# Patient Record
Sex: MALE | Race: ASIAN | NOT HISPANIC OR LATINO | URBAN - METROPOLITAN AREA
[De-identification: names, ages, dates, MRNs, and addresses within clinical notes are randomized per-mention and may not be internally consistent; named-entity substitution may affect disease eponyms.]

---

## 2017-11-13 ENCOUNTER — INPATIENT (INPATIENT)
Facility: HOSPITAL | Age: 72
LOS: 0 days | Discharge: HOME | DRG: 287 | End: 2017-11-13
Attending: INTERNAL MEDICINE | Admitting: INTERNAL MEDICINE
Payer: COMMERCIAL

## 2017-11-13 ENCOUNTER — TRANSCRIPTION ENCOUNTER (OUTPATIENT)
Age: 72
End: 2017-11-13

## 2017-11-13 VITALS
RESPIRATION RATE: 16 BRPM | DIASTOLIC BLOOD PRESSURE: 66 MMHG | OXYGEN SATURATION: 100 % | HEART RATE: 61 BPM | SYSTOLIC BLOOD PRESSURE: 120 MMHG | TEMPERATURE: 98 F

## 2017-11-13 VITALS
WEIGHT: 173.94 LBS | RESPIRATION RATE: 16 BRPM | DIASTOLIC BLOOD PRESSURE: 77 MMHG | HEART RATE: 58 BPM | SYSTOLIC BLOOD PRESSURE: 135 MMHG | OXYGEN SATURATION: 96 % | HEIGHT: 69 IN

## 2017-11-13 DIAGNOSIS — Z98.890 OTHER SPECIFIED POSTPROCEDURAL STATES: Chronic | ICD-10-CM

## 2017-11-13 DIAGNOSIS — I20.0 UNSTABLE ANGINA: ICD-10-CM

## 2017-11-13 PROBLEM — Z00.00 ENCOUNTER FOR PREVENTIVE HEALTH EXAMINATION: Status: ACTIVE | Noted: 2017-11-13

## 2017-11-13 LAB
ALBUMIN SERPL ELPH-MCNC: 4.4 G/DL — SIGNIFICANT CHANGE UP (ref 3.3–5)
ALP SERPL-CCNC: 35 U/L — LOW (ref 40–120)
ALT FLD-CCNC: 19 U/L RC — SIGNIFICANT CHANGE UP (ref 10–45)
ANION GAP SERPL CALC-SCNC: 12 MMOL/L — SIGNIFICANT CHANGE UP (ref 5–17)
AST SERPL-CCNC: 24 U/L — SIGNIFICANT CHANGE UP (ref 10–40)
BASOPHILS # BLD AUTO: 0.1 K/UL — SIGNIFICANT CHANGE UP (ref 0–0.2)
BASOPHILS NFR BLD AUTO: 1.2 % — SIGNIFICANT CHANGE UP (ref 0–2)
BILIRUB SERPL-MCNC: 0.2 MG/DL — SIGNIFICANT CHANGE UP (ref 0.2–1.2)
BUN SERPL-MCNC: 16 MG/DL — SIGNIFICANT CHANGE UP (ref 7–23)
CALCIUM SERPL-MCNC: 9.7 MG/DL — SIGNIFICANT CHANGE UP (ref 8.4–10.5)
CHLORIDE SERPL-SCNC: 101 MMOL/L — SIGNIFICANT CHANGE UP (ref 96–108)
CO2 SERPL-SCNC: 25 MMOL/L — SIGNIFICANT CHANGE UP (ref 22–31)
CREAT SERPL-MCNC: 1.37 MG/DL — HIGH (ref 0.5–1.3)
EOSINOPHIL # BLD AUTO: 0.3 K/UL — SIGNIFICANT CHANGE UP (ref 0–0.5)
EOSINOPHIL NFR BLD AUTO: 4.8 % — SIGNIFICANT CHANGE UP (ref 0–6)
GAS PNL BLDV: SIGNIFICANT CHANGE UP
GLUCOSE BLDC GLUCOMTR-MCNC: 62 MG/DL — LOW (ref 70–99)
GLUCOSE BLDC GLUCOMTR-MCNC: 72 MG/DL — SIGNIFICANT CHANGE UP (ref 70–99)
GLUCOSE SERPL-MCNC: 211 MG/DL — HIGH (ref 70–99)
HCT VFR BLD CALC: 38.9 % — LOW (ref 39–50)
HGB BLD-MCNC: 12.7 G/DL — LOW (ref 13–17)
INR BLD: 1.11 RATIO — SIGNIFICANT CHANGE UP (ref 0.88–1.16)
LYMPHOCYTES # BLD AUTO: 2.5 K/UL — SIGNIFICANT CHANGE UP (ref 1–3.3)
LYMPHOCYTES # BLD AUTO: 39.6 % — SIGNIFICANT CHANGE UP (ref 13–44)
MCHC RBC-ENTMCNC: 27 PG — SIGNIFICANT CHANGE UP (ref 27–34)
MCHC RBC-ENTMCNC: 32.8 GM/DL — SIGNIFICANT CHANGE UP (ref 32–36)
MCV RBC AUTO: 82.5 FL — SIGNIFICANT CHANGE UP (ref 80–100)
MONOCYTES # BLD AUTO: 0.5 K/UL — SIGNIFICANT CHANGE UP (ref 0–0.9)
MONOCYTES NFR BLD AUTO: 8.8 % — SIGNIFICANT CHANGE UP (ref 2–14)
NEUTROPHILS # BLD AUTO: 2.9 K/UL — SIGNIFICANT CHANGE UP (ref 1.8–7.4)
NEUTROPHILS NFR BLD AUTO: 45.7 % — SIGNIFICANT CHANGE UP (ref 43–77)
PLATELET # BLD AUTO: 216 K/UL — SIGNIFICANT CHANGE UP (ref 150–400)
POTASSIUM SERPL-MCNC: 4 MMOL/L — SIGNIFICANT CHANGE UP (ref 3.5–5.3)
POTASSIUM SERPL-SCNC: 4 MMOL/L — SIGNIFICANT CHANGE UP (ref 3.5–5.3)
PROT SERPL-MCNC: 7.1 G/DL — SIGNIFICANT CHANGE UP (ref 6–8.3)
PROTHROM AB SERPL-ACNC: 12 SEC — SIGNIFICANT CHANGE UP (ref 9.8–12.7)
RBC # BLD: 4.71 M/UL — SIGNIFICANT CHANGE UP (ref 4.2–5.8)
RBC # FLD: 11.8 % — SIGNIFICANT CHANGE UP (ref 10.3–14.5)
SODIUM SERPL-SCNC: 138 MMOL/L — SIGNIFICANT CHANGE UP (ref 135–145)
TROPONIN T SERPL-MCNC: <0.01 NG/ML — SIGNIFICANT CHANGE UP (ref 0–0.06)
WBC # BLD: 6.3 K/UL — SIGNIFICANT CHANGE UP (ref 3.8–10.5)
WBC # FLD AUTO: 6.3 K/UL — SIGNIFICANT CHANGE UP (ref 3.8–10.5)

## 2017-11-13 PROCEDURE — 93458 L HRT ARTERY/VENTRICLE ANGIO: CPT | Mod: 26

## 2017-11-13 PROCEDURE — 99285 EMERGENCY DEPT VISIT HI MDM: CPT

## 2017-11-13 PROCEDURE — 71010: CPT | Mod: 26

## 2017-11-13 PROCEDURE — 99223 1ST HOSP IP/OBS HIGH 75: CPT

## 2017-11-13 PROCEDURE — 99152 MOD SED SAME PHYS/QHP 5/>YRS: CPT

## 2017-11-13 RX ORDER — ATORVASTATIN CALCIUM 80 MG/1
40 TABLET, FILM COATED ORAL AT BEDTIME
Qty: 0 | Refills: 0 | Status: DISCONTINUED | OUTPATIENT
Start: 2017-11-13 | End: 2017-11-13

## 2017-11-13 RX ORDER — HEPARIN SODIUM 5000 [USP'U]/ML
4700 INJECTION INTRAVENOUS; SUBCUTANEOUS EVERY 6 HOURS
Qty: 0 | Refills: 0 | Status: DISCONTINUED | OUTPATIENT
Start: 2017-11-13 | End: 2017-11-13

## 2017-11-13 RX ORDER — NITROGLYCERIN 6.5 MG
1 CAPSULE, EXTENDED RELEASE ORAL
Qty: 0 | Refills: 0 | COMMUNITY

## 2017-11-13 RX ORDER — ROSUVASTATIN CALCIUM 5 MG/1
1 TABLET ORAL
Qty: 90 | Refills: 0
Start: 2017-11-13 | End: 2018-02-11

## 2017-11-13 RX ORDER — ASPIRIN/CALCIUM CARB/MAGNESIUM 324 MG
325 TABLET ORAL ONCE
Qty: 0 | Refills: 0 | Status: COMPLETED | OUTPATIENT
Start: 2017-11-13 | End: 2017-11-13

## 2017-11-13 RX ORDER — CLOPIDOGREL BISULFATE 75 MG/1
75 TABLET, FILM COATED ORAL DAILY
Qty: 0 | Refills: 0 | Status: DISCONTINUED | OUTPATIENT
Start: 2017-11-13 | End: 2017-11-13

## 2017-11-13 RX ORDER — HEPARIN SODIUM 5000 [USP'U]/ML
4700 INJECTION INTRAVENOUS; SUBCUTANEOUS ONCE
Qty: 0 | Refills: 0 | Status: COMPLETED | OUTPATIENT
Start: 2017-11-13 | End: 2017-11-13

## 2017-11-13 RX ORDER — ASPIRIN/CALCIUM CARB/MAGNESIUM 324 MG
81 TABLET ORAL DAILY
Qty: 0 | Refills: 0 | Status: DISCONTINUED | OUTPATIENT
Start: 2017-11-13 | End: 2017-11-13

## 2017-11-13 RX ORDER — CLOPIDOGREL BISULFATE 75 MG/1
1 TABLET, FILM COATED ORAL
Qty: 90 | Refills: 0 | OUTPATIENT
Start: 2017-11-13 | End: 2018-02-11

## 2017-11-13 RX ORDER — METFORMIN HYDROCHLORIDE 850 MG/1
0 TABLET ORAL
Qty: 0 | Refills: 0 | COMMUNITY

## 2017-11-13 RX ORDER — ROSUVASTATIN CALCIUM 5 MG/1
1 TABLET ORAL
Qty: 0 | Refills: 0 | COMMUNITY

## 2017-11-13 RX ORDER — HEPARIN SODIUM 5000 [USP'U]/ML
INJECTION INTRAVENOUS; SUBCUTANEOUS
Qty: 25000 | Refills: 0 | Status: DISCONTINUED | OUTPATIENT
Start: 2017-11-13 | End: 2017-11-13

## 2017-11-13 RX ORDER — NITROGLYCERIN 6.5 MG
1 CAPSULE, EXTENDED RELEASE ORAL
Qty: 30 | Refills: 0 | OUTPATIENT
Start: 2017-11-13 | End: 2017-12-13

## 2017-11-13 RX ORDER — METFORMIN HYDROCHLORIDE 850 MG/1
1 TABLET ORAL
Qty: 0 | Refills: 0 | COMMUNITY

## 2017-11-13 RX ADMIN — HEPARIN SODIUM 950 UNIT(S)/HR: 5000 INJECTION INTRAVENOUS; SUBCUTANEOUS at 13:41

## 2017-11-13 RX ADMIN — Medication 325 MILLIGRAM(S): at 11:21

## 2017-11-13 RX ADMIN — HEPARIN SODIUM 4700 UNIT(S): 5000 INJECTION INTRAVENOUS; SUBCUTANEOUS at 13:36

## 2017-11-13 NOTE — CONSULT NOTE ADULT - ATTENDING COMMENTS
Patient interviewed, examined and chart reviewed.  Case discussed with fellow.  Agree w/ Assessment and Plan as outlined.    Rj Miller MD [facility]  P: [phone]  Spectra:  [phone]  Office: [phone]

## 2017-11-13 NOTE — H&P CARDIOLOGY - PMH
Diabetes    Dyslipidemia    Hypertension Constipation    Diabetes    Dyslipidemia    Hypertension    Hyperthyroidism CAD (coronary artery disease)    Constipation    Diabetes    Dyslipidemia    Hypertension    Hyperthyroidism

## 2017-11-13 NOTE — ED PROVIDER NOTE - MEDICAL DECISION MAKING DETAILS
pT WITH cad HAD ANGIO 2013 LAD stenosis with good collaterals has worsening with exertional chest pain also rest pains no fever cough Heart regular no murmur no gallop Lungs clear to A and P abdomen soft non focal neuro exam will admit for coronaries eval start on heparin--Connor

## 2017-11-13 NOTE — DISCHARGE NOTE ADULT - MEDICATION SUMMARY - MEDICATIONS TO STOP TAKING
I will STOP taking the medications listed below when I get home from the hospital:    rosuvastatin 10 mg oral tablet  -- 1 tab(s) by mouth once a day (at bedtime)

## 2017-11-13 NOTE — DISCHARGE NOTE ADULT - HOSPITAL COURSE
72yo gentleman w/ PMHx CAD  prior stent mLAD, HTN, HLD,  T 2 DM with  Hg A1c  6.7. He presents  with a 3 day hx of 3/10 exertion chest pain that is  non radiating substernal to L. side with  a few steps associated with L. arm paresthesias, lightheadedness, and feeling warm/flushed. The symptoms improve with rest. No episodes at rest.  No associated nausea/vomiting, abdominal pain, back pain, or significant SOB. Currently chest pain free. The patient is s/p cardiac cath via RRA with mLAD 100% . Plan is to stage intervention next week. Right radial site without bleeding or hematoma, strong pulse and good cap refill of fingers. The patient medication have been adjusted. Will add Plavix 75 mg po qd, increase crestor 20 mg po qd.  Patient advised to return to hospital with chest pain.

## 2017-11-13 NOTE — DISCHARGE NOTE ADULT - MEDICATION SUMMARY - MEDICATIONS TO TAKE
I will START or STAY ON the medications listed below when I get home from the hospital:    Aspirin Enteric Coated 81 mg oral delayed release tablet  -- 1 tab(s) by mouth once a day  -- Indication: For Heart    nitroglycerin 0.4 mg sublingual tablet  -- 1 tab(s) under tongue every 5 minutes  x 3 doses as needed  -- It is very important that you take or use this exactly as directed.  Do not skip doses or discontinue unless directed by your doctor.    -- Indication: For Chest pain    metFORMIN 500 mg oral tablet, extended release  -- 1 tab(s) by mouth once a day, restart on 11/16/17  -- Indication: For Diabetes    Crestor 20 mg oral tablet  -- 1 tab(s) by mouth once a day (at bedtime)   -- Do not take dairy products, antacids, or iron preparations within one hour of this medication.  Do not take this drug if you are pregnant.  It is very important that you take or use this exactly as directed.  Do not skip doses or discontinue unless directed by your doctor.    -- Indication: For Cholesterol    fenofibric acid 135 mg oral delayed release capsule  -- 1 cap(s) by mouth once a day  -- Indication: For Cholestrol    Plavix 75 mg oral tablet  -- 1 tab(s) by mouth once a day   -- Do not take aspirin or aspirin containing products without knowledge and consent of your physician.    -- Indication: For Heart    methIMAzole 5 mg oral tablet  -- orally once a day  -- Indication: For Thyroid    Bystolic 2.5 mg oral tablet  -- 1 tab(s) by mouth once a day  -- Indication: For Blood pressure    Linzess 72 mcg oral capsule  -- 1 cap(s) by mouth once a day, As Needed  -- Indication: For Constipation    glucosamine  -- Indication: For supplement    Centrum oral tablet  -- 1 tab(s) by mouth once a day  -- Indication: For supplement    Vitamin D3 2000 intl units oral capsule  -- 1 cap(s) by mouth once a day  -- Indication: For supplement

## 2017-11-13 NOTE — ED PROVIDER NOTE - PMH
CAD (coronary artery disease)    Constipation    Diabetes    Dyslipidemia    Hypertension    Hyperthyroidism

## 2017-11-13 NOTE — DISCHARGE NOTE ADULT - PATIENT PORTAL LINK FT
“You can access the Follow[employer] Patient Portal, offered by [facility], by registering with the following website: [email or web address]”

## 2017-11-13 NOTE — DISCHARGE NOTE ADULT - CARE PROVIDER_API CALL
Homa Calvert), Psychiatry  [address] [address]  Phone: [phone]  Fax: [phone]

## 2017-11-13 NOTE — ED PROVIDER NOTE - FAMILY HISTORY
Father  Still living? Unknown  Family history of heart attack, Age at diagnosis: Age Unknown     Mother  Still living? Unknown  Family history of heart attack, Age at diagnosis: Age Unknown     Sibling  Still living? Unknown  Family history of heart attack, Age at diagnosis: Age Unknown

## 2017-11-13 NOTE — H&P CARDIOLOGY - HISTORY OF PRESENT ILLNESS
70yo gentleman w/ PMHx CAD  prior stent mLAD, HTN, HLD,  T 2 DM with  Hg A1c . He presents  with a 3 day hx of 3/10 exertion chest pain that is  non radiating substernal to L. side with  a few steps associated with L. arm paresthesias, lightheadedness, and feeling warm/flushed. The symptoms improve with rest. No episodes at rest.  No associated nausea/vomiting, abdominal pain, back pain, or significant SOB. Currently chest pain free. 70yo gentleman w/ PMHx CAD  prior stent mLAD, HTN, HLD,  T 2 DM with  Hg A1c  6.7. He presents  with a 3 day hx of 3/10 exertion chest pain that is  non radiating substernal to L. side with  a few steps associated with L. arm paresthesias, lightheadedness, and feeling warm/flushed. The symptoms improve with rest. No episodes at rest.  No associated nausea/vomiting, abdominal pain, back pain, or significant SOB. Currently chest pain free. 72yo gentleman w/ PMHx CAD  prior stent mLAD, HTN, HLD,  T 2 DM with  Hg A1c  6.7. He presents  with a 3 day hx of 3/10 exertion chest pain that is  non radiating substernal to L. side with  a few steps associated with L. arm paresthesias, lightheadedness, and feeling warm/flushed. The symptoms improve with rest. No episodes at rest.  No associated nausea/vomiting, abdominal pain, back pain, or significant SOB. Currently chest pain free.     This  H& P was completed  post cardiac cath

## 2017-11-13 NOTE — CONSULT NOTE ADULT - SUBJECTIVE AND OBJECTIVE BOX
Date of Admission:    Patient is a 71y old  Male who presents with a chief complaint of     HISTORY OF PRESENT ILLNESS:     70yo gentleman w/PMHx CAD  prior stent mLAD, HTN, HLD, DM presenting with exertional chest pain for 3 days. Reporting nonradiating substernal to L. sided chest pain 3/10 when taking a few steps associated with L. arm paresthesias, lightheadedness, and feeling warm/flushed. The symptoms improve with rest. No episodes at rest. Reports that he is adherent with his medications. No associated nausea/vomiting, abdominal pain, back pain, or significant SOB. Currently chest pain free.     Allergies    No Known Allergies  	  MEDICATIONS:  Fenofibric acid 135mg   rosuvastatin 10mg qd  bystolic 2.5mg qd  aspirin 81mg qd  metformin  methimazole 5mg qd  Linzeee 72mcg  MVI             PAST MEDICAL & SURGICAL HISTORY:  CAD  HTN  HLD     FAMILY HISTORY:  Mother/Father, multiple siblings with CAD/HTN/HLD    SOCIAL HISTORY:    [ ] Non-smoker        REVIEW OF SYSTEMS:    CONSTITUTIONAL: No weakness, fevers or chills  EYES/ENT: No visual changes;  No dysphagia  NECK: No pain or stiffness  RESPIRATORY: No cough, wheezing, hemoptysis; No shortness of breath  CARDIOVASCULAR: No palpitations; No lower extremity edema  GASTROINTESTINAL: No abdominal or epigastric pain. No nausea, vomiting, or hematemesis; No diarrhea or constipation. No melena or hematochezia.  BACK: No back pain  GENITOURINARY: No dysuria, frequency or hematuria  NEUROLOGICAL: No numbness or weakness  SKIN: No itching, burning, rashes, or lesions   All other review of systems is negative unless indicated above.    PHYSICAL EXAM:  T(C): 36.5 (11-13-17 @ 11:38), Max: 36.6 (11-13-17 @ 11:30)  HR: 61 (11-13-17 @ 11:38) (57 - 61)  BP: 120/66 (11-13-17 @ 11:38) (118/69 - 120/66)  RR: 20 (11-13-17 @ 11:38) (16 - 20)  SpO2: 100% (11-13-17 @ 11:38) (97% - 100%)  Wt(kg): --  I&O's Summary      Appearance: Well appearing, no acute distress, wife and daughter at bedside 	  HEENT:   Normal oral mucosa, PERRL, EOMI	  Lymphatic: No lymphadenopathy  Cardiovascular: Normal S1 S2, No JVD, No murmurs, No edema  Respiratory: Lungs clear to auscultation	  Psychiatry: A & O x 3, Mood & affect appropriate  Gastrointestinal:  Soft, Non-tender, + BS	  Skin: No rashes, No ecchymoses, No cyanosis	  Neurologic: Non-focal  Extremities: Normal range of motion, No clubbing, cyanosis or edema  Vascular: Peripheral pulses palpable 2+ bilaterally        LABS:	 	    CBC Full  -  ( 13 Nov 2017 10:17 )  WBC Count : 6.3 K/uL  Hemoglobin : 12.7 g/dL  Hematocrit : 38.9 %  Platelet Count - Automated : 216 K/uL  Mean Cell Volume : 82.5 fl  Mean Cell Hemoglobin : 27.0 pg  Mean Cell Hemoglobin Concentration : 32.8 gm/dL  Auto Neutrophil # : 2.9 K/uL  Auto Lymphocyte # : 2.5 K/uL  Auto Monocyte # : 0.5 K/uL  Auto Eosinophil # : 0.3 K/uL  Auto Basophil # : 0.1 K/uL  Auto Neutrophil % : 45.7 %  Auto Lymphocyte % : 39.6 %  Auto Monocyte % : 8.8 %  Auto Eosinophil % : 4.8 %  Auto Basophil % : 1.2 %    11-13    138  |  101  |  16  ----------------------------<  211<H>  4.0   |  25  |  1.37<H>    Ca    9.7      13 Nov 2017 10:17    TPro  7.1  /  Alb  4.4  /  TBili  0.2  /  DBili  x   /  AST  24  /  ALT  19  /  AlkPhos  35<L>  11-13      Initial trop negative < 0.01     ECG:  	SR 60 with 1st degree block, TWI in lateral leads; unchanged from previous 5/2017     	  ASSESSMENT/PLAN: 	    71M w/known CAD  mLAD, HTN, HLD, DM p/w exertional chest pain concerning for unstable angina, initial enzymes negative and no acute EKG changes.     #Exertional CP concerning for unstable angina, initial enzymes negative and no acute EKG changes.   - Recommend plavix 300mg load, followed by Plavix 75mg qd   - s/p ASA load, c/w asa 81mqd  - Serial enzymes and EKG  - Heparin gtt for ACS management  - Will require C during this admission.     J Davidn [id number]

## 2017-11-13 NOTE — DISCHARGE NOTE ADULT - PLAN OF CARE
Your blood pressure will be controlled. Continue with your blood pressure medications; eat a heart healthy diet with low salt diet; exercise regularly (consult with your physician or cardiologist first); maintain a heart healthy weight; if you smoke - quit (A resource to help you stop smoking is the [facility] Tobacco Control – phone number [phone].); include healthy ways to manage stress. Continue to follow with your primary care physician or cardiologist. patient will be free of chest pain Low salt, low fat diet.   Weight management.   Take medications as prescribed.    No smoking.  Follow up appointments with your doctor(s)  as instruced. Your hemoglobin A1C will be at a normal range (normal range is from 6-8) Continue to follow with your primary care MD or your endocrinologist. Discuss what the goal hemoglobin A1C level is for you.  Follow a heart healthy diabetic diet. If you check your fingerstick glucose at home, call your MD if it is greater than 250mg/dL on 2 occasions or less than 100mg/dL on 2 occasions. Know signs of low blood sugar, such as: dizziness, shakiness, sweating, confusion, hunger, nervousness- drink 4 ounces apple juice if occurs and call your doctor. Know early signs of high blood sugar, such as: frequent urination, increased thirst, blurry vision, fatigue, headache - call your doctor if this occurs. Continue with your blood pressure medications; eat a heart healthy diet with low salt diet; exercise regularly (consult with your physician or cardiologist first); maintain a heart healthy weight; if you smoke - quit (A resource to help you stop smoking is the [facility] Tobacco [facility] – phone number [phone].); include healthy ways to manage stress. Continue to follow with your primary care physician or cardiologist. LDL<70 Goal is to keep LDL<70. Continue with your cholesterol medications as prescribed. Eat a heart healthy diet that is low in saturated fats and salt, and includes whole grains, fruits, vegetables and lean protein; exercise regularly (consult with your physician or cardiologist first); maintain a heart healthy weight; if you smoke - quit (A resource to help you stop smoking is the [facility] for Tobacco Control – phone number [phone].). Continue to follow with your primary physician or cardiologist. No heavy lifting,  pushing, pulling with affected arm for 2 weeks.  No strenuous  activity  for 2 weeks. No sex for 1 week.  No driving for 2 days. You may shower 24 hours following procedure but avoid baths and swimming for 1 week. Check wrist site for bleeding and/or swelling daily following procedure. Call your doctor/cardiologist immediately should it occur or if you have increased/persistent pain at the site. Follow up with your cardiologist in 1- 2 weeks. You may call [facility] at [phone] or [phone] after office hours and weekends with any questions or concerns following your procedure.

## 2017-11-13 NOTE — ED PROVIDER NOTE - OBJECTIVE STATEMENT
72 yo M h/o of 1 stent 2 yrs ago to the LAD, c/o of chest pain over the last few days which has been worsening. Pain is somewhat poorly localized but is described as "tight", non radiating. Patient denies shortness of breath or pleuritic pain.  Not associated with diaphoresis or nausea.

## 2017-11-13 NOTE — DISCHARGE NOTE ADULT - CARE PLAN
Principal Discharge DX:	Hypertension Principal Discharge DX:	Diabetes  Goal:	Your blood pressure will be controlled.  Instructions for follow-up, activity and diet:	Continue with your blood pressure medications; eat a heart healthy diet with low salt diet; exercise regularly (consult with your physician or cardiologist first); maintain a heart healthy weight; if you smoke - quit (A resource to help you stop smoking is the [facility] Tobacco Control – phone number [phone].); include healthy ways to manage stress. Continue to follow with your primary care physician or cardiologist. Principal Discharge DX:	CAD (coronary artery disease)  Goal:	patient will be free of chest pain  Instructions for follow-up, activity and diet:	Low salt, low fat diet.   Weight management.   Take medications as prescribed.    No smoking.  Follow up appointments with your doctor(s)  as instruced.  Secondary Diagnosis:	Diabetes  Goal:	Your hemoglobin A1C will be at a normal range (normal range is from 6-8)  Instructions for follow-up, activity and diet:	Continue to follow with your primary care MD or your endocrinologist. Discuss what the goal hemoglobin A1C level is for you.  Follow a heart healthy diabetic diet. If you check your fingerstick glucose at home, call your MD if it is greater than 250mg/dL on 2 occasions or less than 100mg/dL on 2 occasions. Know signs of low blood sugar, such as: dizziness, shakiness, sweating, confusion, hunger, nervousness- drink 4 ounces apple juice if occurs and call your doctor. Know early signs of high blood sugar, such as: frequent urination, increased thirst, blurry vision, fatigue, headache - call your doctor if this occurs.  Secondary Diagnosis:	Hypertension  Goal:	Your blood pressure will be controlled.  Instructions for follow-up, activity and diet:	Continue with your blood pressure medications; eat a heart healthy diet with low salt diet; exercise regularly (consult with your physician or cardiologist first); maintain a heart healthy weight; if you smoke - quit (A resource to help you stop smoking is the [facility] [employer] [facility] – phone number [phone].); include healthy ways to manage stress. Continue to follow with your primary care physician or cardiologist.  Secondary Diagnosis:	Dyslipidemia  Goal:	LDL<70  Instructions for follow-up, activity and diet:	Goal is to keep LDL<70. Continue with your cholesterol medications as prescribed. Eat a heart healthy diet that is low in saturated fats and salt, and includes whole grains, fruits, vegetables and lean protein; exercise regularly (consult with your physician or cardiologist first); maintain a heart healthy weight; if you smoke - quit (A resource to help you stop smoking is the [facility] [employer] for Tobacco Control – phone number [phone].). Continue to follow with your primary physician or cardiologist.  Instructions for follow-up, activity and diet:	No heavy lifting,  pushing, pulling with affected arm for 2 weeks.  No strenuous  activity  for 2 weeks. No sex for 1 week.  No driving for 2 days. You may shower 24 hours following procedure but avoid baths and swimming for 1 week. Check wrist site for bleeding and/or swelling daily following procedure. Call your doctor/cardiologist immediately should it occur or if you have increased/persistent pain at the site. Follow up with your cardiologist in 1- 2 weeks. You may call [facility] at [phone] or [phone] after office hours and weekends with any questions or concerns following your procedure.

## 2017-11-13 NOTE — ED PROVIDER NOTE - PROGRESS NOTE DETAILS
Cardiology consult requested. Patient treated with  mg PO. Patient without active chest pain at present. After cardiology consult, requests heparin which was ordered in the ED and patient to be admitted.

## 2017-11-13 NOTE — ED PROVIDER NOTE - ATTENDING CONTRIBUTION TO CARE
I have seen and evaluated this patient with the resident.   I agree with the findings  unless other wise stated.  I have made appropriate changes in documentations where needed, After my face to face bedside evaluation, I am further  noting:  pT WITH cad HAD ANGIO 2013 LAD stenosis with good collaterals has worsening with exertional chest pain also rest pains no fever cough Heart regular no murmur no gallop Lungs clear to A and P abdomen soft non focal neuro exam will admit for coronaries eval start on heparin--Connor

## 2017-12-19 PROCEDURE — 84484 ASSAY OF TROPONIN QUANT: CPT

## 2017-12-19 PROCEDURE — 82947 ASSAY GLUCOSE BLOOD QUANT: CPT

## 2017-12-19 PROCEDURE — 82435 ASSAY OF BLOOD CHLORIDE: CPT

## 2017-12-19 PROCEDURE — C1887: CPT

## 2017-12-19 PROCEDURE — 85014 HEMATOCRIT: CPT

## 2017-12-19 PROCEDURE — 85027 COMPLETE CBC AUTOMATED: CPT

## 2017-12-19 PROCEDURE — 84295 ASSAY OF SERUM SODIUM: CPT

## 2017-12-19 PROCEDURE — 96374 THER/PROPH/DIAG INJ IV PUSH: CPT

## 2017-12-19 PROCEDURE — 85610 PROTHROMBIN TIME: CPT

## 2017-12-19 PROCEDURE — 82330 ASSAY OF CALCIUM: CPT

## 2017-12-19 PROCEDURE — C1769: CPT

## 2017-12-19 PROCEDURE — 85730 THROMBOPLASTIN TIME PARTIAL: CPT

## 2017-12-19 PROCEDURE — 83605 ASSAY OF LACTIC ACID: CPT

## 2017-12-19 PROCEDURE — 82803 BLOOD GASES ANY COMBINATION: CPT

## 2017-12-19 PROCEDURE — 99285 EMERGENCY DEPT VISIT HI MDM: CPT | Mod: 25

## 2017-12-19 PROCEDURE — C1894: CPT

## 2017-12-19 PROCEDURE — 82962 GLUCOSE BLOOD TEST: CPT

## 2017-12-19 PROCEDURE — 71045 X-RAY EXAM CHEST 1 VIEW: CPT

## 2017-12-19 PROCEDURE — 99152 MOD SED SAME PHYS/QHP 5/>YRS: CPT

## 2017-12-19 PROCEDURE — 93458 L HRT ARTERY/VENTRICLE ANGIO: CPT

## 2017-12-19 PROCEDURE — 80053 COMPREHEN METABOLIC PANEL: CPT

## 2017-12-19 PROCEDURE — 84132 ASSAY OF SERUM POTASSIUM: CPT

## 2018-09-19 ENCOUNTER — INPATIENT (INPATIENT)
Facility: HOSPITAL | Age: 73
LOS: 0 days | Discharge: HOME | DRG: 303 | End: 2018-09-20
Attending: INTERNAL MEDICINE | Admitting: INTERNAL MEDICINE
Payer: COMMERCIAL

## 2018-09-19 VITALS
HEART RATE: 66 BPM | RESPIRATION RATE: 16 BRPM | WEIGHT: 173.94 LBS | HEIGHT: 69 IN | DIASTOLIC BLOOD PRESSURE: 68 MMHG | TEMPERATURE: 98 F | SYSTOLIC BLOOD PRESSURE: 129 MMHG | OXYGEN SATURATION: 98 %

## 2018-09-19 DIAGNOSIS — Z98.890 OTHER SPECIFIED POSTPROCEDURAL STATES: Chronic | ICD-10-CM

## 2018-09-19 DIAGNOSIS — R07.9 CHEST PAIN, UNSPECIFIED: ICD-10-CM

## 2018-09-19 PROBLEM — E05.90 THYROTOXICOSIS, UNSPECIFIED WITHOUT THYROTOXIC CRISIS OR STORM: Chronic | Status: ACTIVE | Noted: 2017-11-13

## 2018-09-19 PROBLEM — I25.10 ATHEROSCLEROTIC HEART DISEASE OF NATIVE CORONARY ARTERY WITHOUT ANGINA PECTORIS: Chronic | Status: ACTIVE | Noted: 2017-11-13

## 2018-09-19 PROBLEM — E78.5 HYPERLIPIDEMIA, UNSPECIFIED: Chronic | Status: ACTIVE | Noted: 2017-11-13

## 2018-09-19 PROBLEM — I10 ESSENTIAL (PRIMARY) HYPERTENSION: Chronic | Status: ACTIVE | Noted: 2017-11-13

## 2018-09-19 PROBLEM — E11.9 TYPE 2 DIABETES MELLITUS WITHOUT COMPLICATIONS: Chronic | Status: ACTIVE | Noted: 2017-11-13

## 2018-09-19 PROBLEM — K59.00 CONSTIPATION, UNSPECIFIED: Chronic | Status: ACTIVE | Noted: 2017-11-13

## 2018-09-19 LAB
ALBUMIN SERPL ELPH-MCNC: 4.6 G/DL — SIGNIFICANT CHANGE UP (ref 3.3–5)
ALP SERPL-CCNC: 46 U/L — SIGNIFICANT CHANGE UP (ref 40–120)
ALT FLD-CCNC: 19 U/L — SIGNIFICANT CHANGE UP (ref 10–45)
ANION GAP SERPL CALC-SCNC: 13 MMOL/L — SIGNIFICANT CHANGE UP (ref 5–17)
APTT BLD: 27.9 SEC — SIGNIFICANT CHANGE UP (ref 27.5–37.4)
AST SERPL-CCNC: 23 U/L — SIGNIFICANT CHANGE UP (ref 10–40)
BASOPHILS # BLD AUTO: 0.1 K/UL — SIGNIFICANT CHANGE UP (ref 0–0.2)
BASOPHILS NFR BLD AUTO: 1.2 % — SIGNIFICANT CHANGE UP (ref 0–2)
BILIRUB SERPL-MCNC: 0.2 MG/DL — SIGNIFICANT CHANGE UP (ref 0.2–1.2)
BUN SERPL-MCNC: 20 MG/DL — SIGNIFICANT CHANGE UP (ref 7–23)
CALCIUM SERPL-MCNC: 9.9 MG/DL — SIGNIFICANT CHANGE UP (ref 8.4–10.5)
CHLORIDE SERPL-SCNC: 98 MMOL/L — SIGNIFICANT CHANGE UP (ref 96–108)
CK MB BLD-MCNC: 3.1 % — SIGNIFICANT CHANGE UP (ref 0–3.5)
CK MB CFR SERPL CALC: 4 NG/ML — SIGNIFICANT CHANGE UP (ref 0–6.7)
CK MB CFR SERPL CALC: 4 NG/ML — SIGNIFICANT CHANGE UP (ref 0–6.7)
CK SERPL-CCNC: 130 U/L — SIGNIFICANT CHANGE UP (ref 30–200)
CO2 SERPL-SCNC: 23 MMOL/L — SIGNIFICANT CHANGE UP (ref 22–31)
CREAT SERPL-MCNC: 1.43 MG/DL — HIGH (ref 0.5–1.3)
EOSINOPHIL # BLD AUTO: 0.2 K/UL — SIGNIFICANT CHANGE UP (ref 0–0.5)
EOSINOPHIL NFR BLD AUTO: 3.1 % — SIGNIFICANT CHANGE UP (ref 0–6)
GLUCOSE SERPL-MCNC: 285 MG/DL — HIGH (ref 70–99)
HCT VFR BLD CALC: 37.2 % — LOW (ref 39–50)
HGB BLD-MCNC: 12.5 G/DL — LOW (ref 13–17)
INR BLD: 1.1 RATIO — SIGNIFICANT CHANGE UP (ref 0.88–1.16)
LYMPHOCYTES # BLD AUTO: 2.2 K/UL — SIGNIFICANT CHANGE UP (ref 1–3.3)
LYMPHOCYTES # BLD AUTO: 34.3 % — SIGNIFICANT CHANGE UP (ref 13–44)
MCHC RBC-ENTMCNC: 26.9 PG — LOW (ref 27–34)
MCHC RBC-ENTMCNC: 33.7 GM/DL — SIGNIFICANT CHANGE UP (ref 32–36)
MCV RBC AUTO: 79.9 FL — LOW (ref 80–100)
MONOCYTES # BLD AUTO: 0.3 K/UL — SIGNIFICANT CHANGE UP (ref 0–0.9)
MONOCYTES NFR BLD AUTO: 5.4 % — SIGNIFICANT CHANGE UP (ref 2–14)
NEUTROPHILS # BLD AUTO: 3.6 K/UL — SIGNIFICANT CHANGE UP (ref 1.8–7.4)
NEUTROPHILS NFR BLD AUTO: 56.1 % — SIGNIFICANT CHANGE UP (ref 43–77)
PLATELET # BLD AUTO: 256 K/UL — SIGNIFICANT CHANGE UP (ref 150–400)
POTASSIUM SERPL-MCNC: 4.2 MMOL/L — SIGNIFICANT CHANGE UP (ref 3.5–5.3)
POTASSIUM SERPL-SCNC: 4.2 MMOL/L — SIGNIFICANT CHANGE UP (ref 3.5–5.3)
PROT SERPL-MCNC: 7.2 G/DL — SIGNIFICANT CHANGE UP (ref 6–8.3)
PROTHROM AB SERPL-ACNC: 11.9 SEC — SIGNIFICANT CHANGE UP (ref 9.8–12.7)
RBC # BLD: 4.66 M/UL — SIGNIFICANT CHANGE UP (ref 4.2–5.8)
RBC # FLD: 12.3 % — SIGNIFICANT CHANGE UP (ref 10.3–14.5)
SODIUM SERPL-SCNC: 134 MMOL/L — LOW (ref 135–145)
TROPONIN T, HIGH SENSITIVITY RESULT: 6 NG/L — SIGNIFICANT CHANGE UP (ref 0–51)
TROPONIN T, HIGH SENSITIVITY RESULT: 6 NG/L — SIGNIFICANT CHANGE UP (ref 0–51)
TROPONIN T, HIGH SENSITIVITY RESULT: 7 NG/L — SIGNIFICANT CHANGE UP (ref 0–51)
WBC # BLD: 6.4 K/UL — SIGNIFICANT CHANGE UP (ref 3.8–10.5)
WBC # FLD AUTO: 6.4 K/UL — SIGNIFICANT CHANGE UP (ref 3.8–10.5)

## 2018-09-19 PROCEDURE — 71046 X-RAY EXAM CHEST 2 VIEWS: CPT | Mod: 26

## 2018-09-19 PROCEDURE — 99222 1ST HOSP IP/OBS MODERATE 55: CPT

## 2018-09-19 PROCEDURE — 99285 EMERGENCY DEPT VISIT HI MDM: CPT | Mod: 25

## 2018-09-19 PROCEDURE — 93010 ELECTROCARDIOGRAM REPORT: CPT

## 2018-09-19 PROCEDURE — 99232 SBSQ HOSP IP/OBS MODERATE 35: CPT

## 2018-09-19 RX ORDER — ASPIRIN/CALCIUM CARB/MAGNESIUM 324 MG
324 TABLET ORAL DAILY
Qty: 0 | Refills: 0 | Status: DISCONTINUED | OUTPATIENT
Start: 2018-09-19 | End: 2018-09-20

## 2018-09-19 RX ORDER — TAMSULOSIN HYDROCHLORIDE 0.4 MG/1
0.4 CAPSULE ORAL AT BEDTIME
Qty: 0 | Refills: 0 | Status: DISCONTINUED | OUTPATIENT
Start: 2018-09-19 | End: 2018-09-20

## 2018-09-19 RX ORDER — MORPHINE SULFATE 50 MG/1
2 CAPSULE, EXTENDED RELEASE ORAL ONCE
Qty: 0 | Refills: 0 | Status: DISCONTINUED | OUTPATIENT
Start: 2018-09-19 | End: 2018-09-19

## 2018-09-19 RX ORDER — ACETAMINOPHEN 500 MG
650 TABLET ORAL ONCE
Qty: 0 | Refills: 0 | Status: COMPLETED | OUTPATIENT
Start: 2018-09-19 | End: 2018-09-19

## 2018-09-19 RX ORDER — METOPROLOL TARTRATE 50 MG
25 TABLET ORAL
Qty: 0 | Refills: 0 | Status: DISCONTINUED | OUTPATIENT
Start: 2018-09-19 | End: 2018-09-20

## 2018-09-19 RX ORDER — PANTOPRAZOLE SODIUM 20 MG/1
40 TABLET, DELAYED RELEASE ORAL
Qty: 0 | Refills: 0 | Status: DISCONTINUED | OUTPATIENT
Start: 2018-09-19 | End: 2018-09-20

## 2018-09-19 RX ORDER — INFLUENZA VIRUS VACCINE 15; 15; 15; 15 UG/.5ML; UG/.5ML; UG/.5ML; UG/.5ML
0.5 SUSPENSION INTRAMUSCULAR ONCE
Qty: 0 | Refills: 0 | Status: DISCONTINUED | OUTPATIENT
Start: 2018-09-19 | End: 2018-09-20

## 2018-09-19 RX ORDER — ATORVASTATIN CALCIUM 80 MG/1
40 TABLET, FILM COATED ORAL AT BEDTIME
Qty: 0 | Refills: 0 | Status: DISCONTINUED | OUTPATIENT
Start: 2018-09-19 | End: 2018-09-20

## 2018-09-19 RX ORDER — FENOFIBRATE,MICRONIZED 130 MG
145 CAPSULE ORAL DAILY
Qty: 0 | Refills: 0 | Status: DISCONTINUED | OUTPATIENT
Start: 2018-09-19 | End: 2018-09-20

## 2018-09-19 RX ADMIN — Medication 650 MILLIGRAM(S): at 23:11

## 2018-09-19 RX ADMIN — TAMSULOSIN HYDROCHLORIDE 0.4 MILLIGRAM(S): 0.4 CAPSULE ORAL at 22:40

## 2018-09-19 RX ADMIN — Medication 145 MILLIGRAM(S): at 22:41

## 2018-09-19 RX ADMIN — Medication 650 MILLIGRAM(S): at 22:41

## 2018-09-19 RX ADMIN — MORPHINE SULFATE 2 MILLIGRAM(S): 50 CAPSULE, EXTENDED RELEASE ORAL at 10:05

## 2018-09-19 RX ADMIN — Medication 324 MILLIGRAM(S): at 10:05

## 2018-09-19 RX ADMIN — Medication 25 MILLIGRAM(S): at 22:40

## 2018-09-19 RX ADMIN — ATORVASTATIN CALCIUM 40 MILLIGRAM(S): 80 TABLET, FILM COATED ORAL at 22:40

## 2018-09-19 RX ADMIN — MORPHINE SULFATE 2 MILLIGRAM(S): 50 CAPSULE, EXTENDED RELEASE ORAL at 11:30

## 2018-09-19 NOTE — H&P ADULT - PROBLEM SELECTOR PLAN 3
as per family very borderline diabetic  takes metformin 500 mg once / day  -will hold off on metformin for now as don't know he will need cardiac cath

## 2018-09-19 NOTE — ED PROVIDER NOTE - ATTENDING CONTRIBUTION TO CARE
****ATTENDING**** 71yo male hx CAD BIB family for chest pain x 1 day. As per daughter and patient he has known LAD disease was not a bypass candidate and suppose to follow up with Dr. Holm for complex PCI. Pt today woke up with chest pain left sided w heaviness and radiation to his L arm. + diaphoresis. States he took SL NTG and now pain is improved. Pt does not have frequent chest pain. No sob, palp. No recent cough, uri, fever, chills.  ON exam, Patient is awake,alert,oriented x 3. Patient is well appearing and in no acute distress. Patient's chest is clear to ausculation, +s1s2. Abdomen is soft nd/nt +BS. Extremity with no swelling or calf tenderness.   EKG reviewed. Check labs, consult cards and admit for tele.

## 2018-09-19 NOTE — ED ADULT NURSE NOTE - CAS EDP DISCH DISPOSITION ADMI
Hx Andriy - Telemetry

## 2018-09-19 NOTE — H&P ADULT - PROBLEM SELECTOR PLAN 1
at present CP free  -c/o HA from SL nitro in ED  -serial CE   -pt. being followed with cardiologist at [facility] [place]  -house cardio consulted

## 2018-09-19 NOTE — ED ADULT NURSE NOTE - OBJECTIVE STATEMENT
71 yo male with DM, HTN, 1 stent placement presents to the ED from home c/o constant, dull chest pain since 0300. patient states he woke up at 0200 to do chores. he states he was sitting in the study room at 0300 when left sided chest pain, radiating to the left shoulder blade and arm started with diaphoresis. he states at 0500 he took 2 nitro 0.4mg SL with little relief of pain. patient in ED c/o 2/10 left sided CP. patient is AAOx3. lung sounds clear bilaterally. cap refill <3sec. patient denies dizziness, SOB, fevers, chills, abdominal pain, back pain, dysuria, N/V/D. VSS. MD at the bedside.

## 2018-09-19 NOTE — CONSULT NOTE ADULT - ASSESSMENT
72M PMHx of DM (A1c 7.0), HTN, HLD, CAD s/p stent mid-LAD '03 w known critical 100% occluded LAD at site of stent, presenting w chest pain. 72M PMHx of DM (A1c 7.0), HTN, HLD, CAD s/p stent mid-LAD '03 w known critical 100% occluded LAD at site of stent, presenting w chest pain.    Chest pain likely unstable angina, less likely evolving ischemic event   EKG w T-wave inversions in lateral leads which are unchanged from previous EKG. Cardiac biomarkers negative x 2  Given hx of CAD  w known stenotic lesion of mid-LAD which appears to be chronic, and disease of RCA, would admit for further ischemic evaluation    Cardiology will follow     YUMIKO Nunez   Cardiology Resident [id number] 72M PMHx of DM (A1c 7.0), HTN, HLD, CAD s/p stent mid-LAD '03 w known critical 100% occluded LAD at site of stent, presenting w chest pain.    Chest pain likely unstable angina, less likely evolving ischemic event   EKG w T-wave inversions in lateral leads which are unchanged from previous EKG. Cardiac biomarkers negative x 2  Recent Cath 11/17 w 40% stenosis Mid-RCA, 100% Mid-LAD   Unclear role for stress testing given known lesion which appears chronic in nature   Recc starting 30 mg QD for anginal symptoms   Cont ASA 81 mg QD  Agree with observation overnight to ensure resolution of anginal symptoms  Will need close outpatient follow up with Cardiologist Dr. Rashid Yost, [facility]     HTN   Cont home dose Bystolic     HLD  Cont home dose Rosuvastatin        Cardiology will follow     YUMIKO Nunez   Cardiology Resident [id number] 72M PMHx of DM (A1c 7.0), HTN, HLD, CAD s/p stent mid-LAD '03 w known critical 100% occluded LAD at site of stent, presenting w chest pain.    Chest pain likely unstable angina, less likely evolving ischemic event   EKG w T-wave inversions in lateral leads which are unchanged from previous EKG. Cardiac biomarkers negative x 2  Recent Cath 11/17 w 40% stenosis Mid-RCA, 100% Mid-LAD   Unclear role for stress testing given known lesion which appears chronic in nature   Recc starting Imdur 30 mg QD for anginal symptoms   Cont ASA 81 mg QD  Agree with observation overnight to ensure resolution of anginal symptoms  Will need close outpatient follow up with Cardiologist Dr. Rashid Yost, [facility]     HTN   Cont home dose Bystolic     HLD  Cont home dose Rosuvastatin        Cardiology will follow     YMUIKO Nunez   Cardiology Resident [id number] 72M PMHx of DM (A1c 7.0), HTN, HLD, CAD s/p stent mid-LAD '03 w known critical 100% occluded LAD at site of stent, presenting with brief transient chest discomfort.     1) CAD  s/p mLAD stent 03 now totally occluded per cath 2017  Chest pain possibly associated with mild ischemia of anterior myocardium in the territory of  mLAD. This territory is revascularized by collaterals.   EKG w T-wave inversions in lateral leads which are unchanged from previous EKG.   Cardiac biomarkers negative x 2  Recent Cath 11/17 w 40% stenosis Mid-RCA, 100% Mid-LAD     No beneficial role for stress testing given known lesion which appears chronic in nature. I do not expect his lesions to have changed substantially.   Maximize anti-anginal regimen before considering intervention of a  that has been present for years. There is plenty of room for additional medical therapy.     ·	Recc starting Imdur 30 mg QD  ·	Cont ASA 81 mg QD  ·	Observation overnight to ensure resolution of anginal symptoms and can be discharged tomorrow with outpatient follow up with Cardiologist Dr. Rashid Yost, [facility].    2) HTN   ·	Cont home dose Bystolic     3) HLD  ·	Cont home dose Rosuvastatin      YUMIKO Nunez   Cardiology Resident [id number]

## 2018-09-19 NOTE — H&P ADULT - PROBLEM SELECTOR PLAN 2
Hx of critical blockage of LAD on last cath about 1 year ago.   -seen several cardio in last one year and has difference of opinion regarding CABG Vs angioplasty : pt. and family could never decide on that.   -stable from last 1 year, today was the first episode of CP

## 2018-09-19 NOTE — ED ADULT NURSE REASSESSMENT NOTE - NS ED NURSE REASSESS COMMENT FT1
Pt received from [facility] [place]. PT AOx3, breathing even unlabored spontaneously, lungs CTA, S1S2 heart sounds heard, VSS, NAD, Sinus virginie on monitor 58 BPM. Pt reporting relief of chest pain, 1/10. Awaits cardiology consult.

## 2018-09-19 NOTE — H&P ADULT - NSHPPHYSICALEXAM_GEN_ALL_CORE
pt. seen and examined, NAD, denies any CP    Vital Signs Last 24 Hrs  T(C): 36.3 (19 Sep 2018 20:50), Max: 37 (19 Sep 2018 16:21)  T(F): 97.4 (19 Sep 2018 20:50), Max: 98.6 (19 Sep 2018 16:21)  HR: 58 (19 Sep 2018 20:50) (55 - 67)  BP: 130/68 (19 Sep 2018 20:50) (106/51 - 134/69)  BP(mean): --  RR: 18 (19 Sep 2018 20:50) (16 - 18)  SpO2: 98% (19 Sep 2018 20:50) (95% - 100%)    heent: nc/at  neck: supple, no JVD  Lungs: B/L clear, no w/r/r  heart: s1s2 nml  abd: soft, NABS, NT/ND  ext: no e/c/c, pulses 2+  neuro: aaox3, no focal deficit

## 2018-09-19 NOTE — ED PROVIDER NOTE - MEDICAL DECISION MAKING DETAILS
72M pmhx of known 100% LAD occlusion, CAD x stenting, HTN HLD presenting with chest pain/ACS rule out. Cath report reviewed from last year Dr. Duffy cardiology. Had been evaluated by CT surgery 1 year ago, not viable candidate for CABG. Labs, troponin, CXR, admit, Cardiology fellow consulted for possible urgent cath.

## 2018-09-19 NOTE — ED ADULT TRIAGE NOTE - CHIEF COMPLAINT QUOTE
chest pressure since yesterday intermittent, denies numbness or tingling, reports left arm heaviness

## 2018-09-19 NOTE — ED PROVIDER NOTE - PROGRESS NOTE DETAILS
patient with no chest pain at this time, no respiratory distress, troponin 6, will repeat troponin. Discussed case with cardiology fellow who will see the patient in 1-2 hours.

## 2018-09-19 NOTE — H&P ADULT - ATTENDING COMMENTS
if cardio clears and CE are neg, will plan for d/c home in am and f/u with his own cardiologist at [facility]  d/w patient and daughter bedside

## 2018-09-19 NOTE — H&P ADULT - HISTORY OF PRESENT ILLNESS
HPI: 72M PMHx of DM (A1c 7.0), HTN, HLD, CAD s/p stent mid-LAD '03 w known critical 100% occluded LAD at site of stent, visualized on 11/17 Cath, presenting with chest pain. Daughter is at bedside who is an Internist to corroborate. Pt awoke from sleep approximately 3am w new left side chest pressure 3/10 in intensity, radiating to left arm, back, associated with dyspnea, exertion, diaphoresis. Chest pain spontaneously resolved but reoccurred at 5AM; pt took 2 Nitroglycerin tablets at that time that did improved the chest discomfort. Pt presented to the ED for further evaluation, was Aspirin loaded and was given 2 mg Morphine for chest discomfort. Pt seen and examined at bedside, currently comfortable with no reoccurrence of chest pain. Denies any nausea/vomiting, fevers, coughing, pleuritic component. Denies recent illness or trauma to the chest. Of note, pt seen by cardiologist at [place] shortly after 11/17, recommended CABG at the time however family refusing intervention. Currently followed by Rashid Yost at [facility], last seen in January for well visit.

## 2018-09-19 NOTE — CONSULT NOTE ADULT - SUBJECTIVE AND OBJECTIVE BOX
Chief Complaint: Chest Pain     HPI:       PMHx:   CAD (coronary artery disease)  Constipation  Hyperthyroidism  Diabetes  Hypertension  Dyslipidemia      PSHx:   H/O cardiac catheterization      Allergies:  No Known Allergies      Home Meds:     Current Medications:   aspirin  chewable 324 milliGRAM(s) Oral daily      FAMILY HISTORY:  Family history of heart attack (Sibling)      Social History:  Smoking History:  Alcohol Use:  Drug Use:    REVIEW OF SYSTEMS:  CONSTITUTIONAL: No weakness, fevers or chills  EYES/ENT: No visual changes;  No dysphagia  NECK: No pain or stiffness  RESPIRATORY: No cough, wheezing, hemoptysis; No shortness of breath  CARDIOVASCULAR: No chest pain or palpitations; No lower extremity edema  GASTROINTESTINAL: No abdominal or epigastric pain. No nausea, vomiting, or hematemesis; No diarrhea or constipation. No melena or hematochezia.  BACK: No back pain  GENITOURINARY: No dysuria, frequency or hematuria  NEUROLOGICAL: No numbness or weakness  SKIN: No itching, burning, rashes, or lesions   All other review of systems is negative unless indicated above.    Physical Exam:  T(F): 97.8 (09-19), Max: 97.8 (09-19)  HR: 67 (09-19) (66 - 67)  BP: 134/69 (09-19) (129/68 - 134/69)  RR: 18 (09-19)  SpO2: 100% (09-19)      GENERAL: No acute distress, well-developed  HEAD:  Atraumatic, Normocephalic  ENT: EOMI, PERRLA, conjunctiva and sclera clear, Neck supple, No JVD, moist mucosa  CHEST/LUNG: Clear to auscultation bilaterally; No wheeze, equal breath sounds bilaterally   BACK: No spinal tenderness  HEART: Regular rate and rhythm; No murmurs, rubs, or gallops  ABDOMEN: Soft, Nontender, Nondistended; Bowel sounds present  EXTREMITIES:  No clubbing, cyanosis, or edema  PSYCH: Nl behavior, nl affect  NEUROLOGY: AAOx3, non-focal, cranial nerves intact  SKIN: Normal color, No rashes or lesions  LINES:    Cardiovascular Diagnostic Testing:    ECG: Personally reviewed:      Stress Testing: < from: Nuclear Stress Test, Pharmacologic (04.24.12 @ 10:30) >  ECG ABNORMALITIES DURING/AFTER STRESS:   ST Changes:ST Depression: 3 mm horizontal in leads II ,  III, aVF, V4, V5, V6 started at 06:00 min of exercise at  HR of 102 and persisted 05:00 min into post infusion.  All Changes resolved by 4 minutes of recovery.  ------------------------------------------------------------------------  NEW ARRHYTHMIAS DEVELOPED DURING/AFTER STRESS:  None  ------------------------------------------------------------------------  STRESS TEST IMPRESSIONS:  Symptom: no chest pain.  HR Response: Appropriate.  BP Response: Appropriate.  Heart Rhythm: Sinus Bradycardia - 55 BPM.  ECG Changes: ST Depression: 3 mm horizontal in leads II ,  III, aVF, V4, V5, V6 started at 06:00 min of exercise at  HR of 102 and persisted 05:00 min into post infusion.  All Changes resolved by 4 minutes of recovery.  Arrhythmia: None.  ------------------------------------------------------------------------  PROCEDURE:  12.5 mCi of Tc 99m Tetrofosmin were injected during stress  protocol. Approximately 45 minutes later, tomographic  images were obtained in a 180 degree arc from right  anterior oblique to left anterior oblique with 64 stops.  The tomographic slices were reconstructed in3 orthogonal  planes (short axis, horizontal long axis and vertical long  axis).  Interpretation was performed both by visual and  quantitative analysis.  Stress images were acquired using CZT-based system with  pinhole collimation ([employer] c, [employer]),  and reconstructed using MLEM algorithm.  ------------------------------------------------------------------------  NUCLEAR FINDINGS:  Review of raw data shows: The study is of good technical  quality.  The left ventricle was normalin size. Normal myocardial  perfusion scan,with no evidence of infarction or inducible  ischemia.  ------------------------------------------------------------------------  GATED ANALYSIS:  Post-stress gated wall motion analysis was performed (LVEF  > 70%;LVEDV = 77 ml.), revealing normal LV function. RV  function appears normal.  ------------------------------------------------------------------------  IMPRESSIONS:Normal Study  * The left ventricle was normal in size.  * Tracer uptake was homogeneous throughout the left  ventricle.  * Normal study; no evidence for myocardial infarction or  ischemia.  * Gated wall motion analysis was performed, and shows  normal wall motion.    < end of copied text >      Cath: < from: Cardiac Cath Lab - Adult (11.13.17 @ 14:11) >  VENTRICLES: EF estimated was 60 %.  CORONARY VESSELS: The coronary circulation is right dominant.  LM:   --  LM: Normal.  LAD:   --  Mid LAD: There was a tubular 100 % stenosis at the site of a  prior angioplasty with stent placement. There was ROSEANNA grade 0 flow  through the vessel (no flow). This is a likely culprit for the patient's  clinical presentation. It appears amenable to percutaneous intervention.  CX:   -- Circumflex: Angiography showed minor luminal irregularities with  no flow limiting lesions.  RCA:   --  Mid RCA: There was a 40 % stenosis.  --  Distal RCA: There was a 40 % stenosis.  COMPLICATIONS: There were no complications.  DIAGNOSTIC RECOMMENDATIONS: GIven the recurrent angina, the mLAD  should  be revascularized with PCI via an antegrade approach.  Will discuss with family and determine optimal timing given the holidays.  INTERVENTIONAL RECOMMENDATIONS: GIven the recurrent angina, themLAD   should be revascularized with PCI via an antegrade approach.  Will discuss with family and determine optimal timing given the holidays.    < end of copied text >      Imaging:    Labs: Personally reviewed                        12.5   6.4   )-----------( 256      ( 19 Sep 2018 10:02 )             37.2     09-19    134<L>  |  98  |  20  ----------------------------<  285<H>  4.2   |  23  |  1.43<H>    Ca    9.9      19 Sep 2018 10:02    TPro  7.2  /  Alb  4.6  /  TBili  0.2  /  DBili  x   /  AST  23  /  ALT  19  /  AlkPhos  46  09-19    PT/INR - ( 19 Sep 2018 10:02 )   PT: 11.9 sec;   INR: 1.10 ratio         PTT - ( 19 Sep 2018 10:02 )  PTT:27.9 sec  CARDIAC MARKERS ( 19 Sep 2018 10:02 )  x     / x     / x     / x     / 4.0 ng/mL Chief Complaint: Chest Pain     HPI: 72M PMHx of DM (A1c 7.0), HTN, HLD, CAD s/p stent mid-LAD '03 w known critical 100% occluded LAD at site of stent, visualized on 11/17 Cath, presenting with chest pain. Daughter is at bedside who is an Internist to corroborate. Pt awoke from sleep approximately 3am w new left side chest pressure 3/10 in intensity, radiating to left arm, back, associated with dyspnea, exertion, diaphoresis. Chest pain spontaneously resolved but reoccurred at 5AM; pt took 2 Nitroglycerin tablets at that time that did not improve the chest discomfort however did eveline the development of a headache. Pt presented to the ED for further evaluation, was Aspirin loaded and was given 2 mg Morphine for chest discomfort. Pt seen and examined at bedside, currently comfortable with no reoccurrence of chest pain. Denies any nausea/vomiting, fevers, coughing, pleuritic component. Denies recent illness or trauma to the chest. Of note, pt seen by cardiologist at [facility] shortly after 11/17, recommended CABG at the time however family refusing intervention. Currently followed by Rashid Yost at [facility], last seen in January for well visit.         PMHx:   CAD (coronary artery disease)  Constipation  Hyperthyroidism  Diabetes  Hypertension  Dyslipidemia      PSHx:   H/O cardiac catheterization      Allergies:  No Known Allergies      Home Meds:   Fenofibrate   Crestor 20 mg  Bystolic 2.5 mg  ASA 81 mg   Metformin 500 mg   Nitroglycerin PRN   Methimazole  Linzess  Centrum   Vitamin D3  Glucosamine  Flomax     Current Medications:   aspirin  chewable 324 milliGRAM(s) Oral daily      FAMILY HISTORY:  Family history of heart attack (Sibling)      Social History:  Smoking History: Denies  Alcohol Use:  Denies  Drug Use:  Denies    REVIEW OF SYSTEMS:  CONSTITUTIONAL: No weakness, fevers or chills diaphoresis   EYES/ENT: No visual changes;  No dysphagia  NECK: No pain or stiffness  RESPIRATORY: No cough, wheezing, hemoptysis; + shortness of breath  CARDIOVASCULAR: +chest pain or palpitations; No lower extremity edema  GASTROINTESTINAL: No abdominal or epigastric pain. No nausea, vomiting, or hematemesis; No diarrhea or constipation. No melena or hematochezia.  BACK: + back pain  GENITOURINARY: No dysuria, frequency or hematuria  NEUROLOGICAL: No numbness or weakness  SKIN: No itching, burning, rashes, or lesions   All other review of systems is negative unless indicated above.    Physical Exam:  T(F): 97.8 (09-19), Max: 97.8 (09-19)  HR: 67 (09-19) (66 - 67)  BP: 134/69 (09-19) (129/68 - 134/69)  RR: 18 (09-19)  SpO2: 100% (09-19)      GENERAL: No acute distress, well-developed elderly male  HEAD:  Atraumatic, Normocephalic  ENT: EOMI, PERRLA, conjunctiva and sclera clear, Neck supple, No JVD, moist mucosa  CHEST/LUNG: Clear to auscultation bilaterally; No wheeze, equal breath sounds bilaterally   BACK: No spinal tenderness  HEART: Regular rate and rhythm; No murmurs, rubs, or gallops  ABDOMEN: Soft, Nontender, Nondistended; Bowel sounds present  EXTREMITIES:  No clubbing, cyanosis, or edema  PSYCH: Nl behavior, nl affect  NEUROLOGY: AAOx3, non-focal, cranial nerves intact  SKIN: Normal color, No rashes or lesions    ECG: NSR 60's T-wave inversion V4-V6, Present on ECG 11/17      Stress Testing: < from: Nuclear Stress Test, Pharmacologic (04.24.12 @ 10:30) >  ECG ABNORMALITIES DURING/AFTER STRESS:   ST Changes:ST Depression: 3 mm horizontal in leads II ,  III, aVF, V4, V5, V6 started at 06:00 min of exercise at  HR of 102 and persisted 05:00 min into post infusion.  All Changes resolved by 4 minutes of recovery.  ------------------------------------------------------------------------  NEW ARRHYTHMIAS DEVELOPED DURING/AFTER STRESS:  None  ------------------------------------------------------------------------  STRESS TEST IMPRESSIONS:  Symptom: no chest pain.  HR Response: Appropriate.  BP Response: Appropriate.  Heart Rhythm: Sinus Bradycardia - 55 BPM.  ECG Changes: ST Depression: 3 mm horizontal in leads II ,  III, aVF, V4, V5, V6 started at 06:00 min of exercise at  HR of 102 and persisted 05:00 min into post infusion.  All Changes resolved by 4 minutes of recovery.  Arrhythmia: None.  ------------------------------------------------------------------------  PROCEDURE:  12.5 mCi of Tc 99m Tetrofosmin were injected during stress  protocol. Approximately 45 minutes later, tomographic  images were obtained in a 180 degree arc from right  anterior oblique to left anterior oblique with 64 stops.  The tomographic slices were reconstructed in3 orthogonal  planes (short axis, horizontal long axis and vertical long  axis).  Interpretation was performed both by visual and  quantitative analysis.  Stress images were acquired using CZT-based system with  pinhole collimation ([employer] c, [employer]),  and reconstructed using MLEM algorithm.  ------------------------------------------------------------------------  NUCLEAR FINDINGS:  Review of raw data shows: The study is of good technical  quality.  The left ventricle was normalin size. Normal myocardial  perfusion scan,with no evidence of infarction or inducible  ischemia.  ------------------------------------------------------------------------  GATED ANALYSIS:  Post-stress gated wall motion analysis was performed (LVEF  > 70%;LVEDV = 77 ml.), revealing normal LV function. RV  function appears normal.  ------------------------------------------------------------------------  IMPRESSIONS:Normal Study  * The left ventricle was normal in size.  * Tracer uptake was homogeneous throughout the left  ventricle.  * Normal study; no evidence for myocardial infarction or  ischemia.  * Gated wall motion analysis was performed, and shows  normal wall motion.    < end of copied text >      Cath: < from: Cardiac Cath Lab - Adult (11.13.17 @ 14:11) >  VENTRICLES: EF estimated was 60 %.  CORONARY VESSELS: The coronary circulation is right dominant.  LM:   --  LM: Normal.  LAD:   --  Mid LAD: There was a tubular 100 % stenosis at the site of a  prior angioplasty with stent placement. There was ROSEANNA grade 0 flow  through the vessel (no flow). This is a likely culprit for the patient's  clinical presentation. It appears amenable to percutaneous intervention.  CX:   -- Circumflex: Angiography showed minor luminal irregularities with  no flow limiting lesions.  RCA:   --  Mid RCA: There was a 40 % stenosis.  --  Distal RCA: There was a 40 % stenosis.  COMPLICATIONS: There were no complications.  DIAGNOSTIC RECOMMENDATIONS: GIven the recurrent angina, the mLAD  should  be revascularized with PCI via an antegrade approach.  Will discuss with family and determine optimal timing given the holidays.  INTERVENTIONAL RECOMMENDATIONS: GIven the recurrent angina, themLAD   should be revascularized with PCI via an antegrade approach.  Will discuss with family and determine optimal timing given the holidays.    < end of copied text >      Imaging:    Labs: Personally reviewed                        12.5   6.4   )-----------( 256      ( 19 Sep 2018 10:02 )             37.2     09-19    134<L>  |  98  |  20  ----------------------------<  285<H>  4.2   |  23  |  1.43<H>    Ca    9.9      19 Sep 2018 10:02    TPro  7.2  /  Alb  4.6  /  TBili  0.2  /  DBili  x   /  AST  23  /  ALT  19  /  AlkPhos  46  09-19    PT/INR - ( 19 Sep 2018 10:02 )   PT: 11.9 sec;   INR: 1.10 ratio         PTT - ( 19 Sep 2018 10:02 )  PTT:27.9 sec  CARDIAC MARKERS ( 19 Sep 2018 10:02 )  x     / x     / x     / x     / 4.0 ng/mL Chief Complaint: Chest Pain     HPI: 72M PMHx of DM (A1c 7.0), HTN, HLD, CAD s/p stent mid-LAD '03 w known critical 100% occluded LAD at site of stent, visualized on 11/17 Cath, presenting with chest pain. Daughter is at bedside who is an Internist to corroborate. Pt awoke from sleep approximately 3am w new left side chest pressure 3/10 in intensity, radiating to left arm, back, associated with dyspnea, exertion, diaphoresis. Chest pain spontaneously resolved but reoccurred at 5AM; pt took 2 Nitroglycerin tablets at that time that did improved the chest discomfort. Pt presented to the ED for further evaluation, was Aspirin loaded and was given 2 mg Morphine for chest discomfort. Pt seen and examined at bedside, currently comfortable with no reoccurrence of chest pain. Denies any nausea/vomiting, fevers, coughing, pleuritic component. Denies recent illness or trauma to the chest. Of note, pt seen by cardiologist at [facility] shortly after 11/17, recommended CABG at the time however family refusing intervention. Currently followed by Rashid Yost at [facility], last seen in January for well visit.         PMHx:   CAD (coronary artery disease)  Constipation  Hyperthyroidism  Diabetes  Hypertension  Dyslipidemia      PSHx:   H/O cardiac catheterization      Allergies:  No Known Allergies      Home Meds:   Fenofibrate   Crestor 20 mg  Bystolic 2.5 mg  ASA 81 mg   Metformin 500 mg   Nitroglycerin PRN   Methimazole  Linzess  Centrum   Vitamin D3  Glucosamine  Flomax     Current Medications:   aspirin  chewable 324 milliGRAM(s) Oral daily      FAMILY HISTORY:  Family history of heart attack (Sibling)      Social History:  Smoking History: Denies  Alcohol Use:  Denies  Drug Use:  Denies    REVIEW OF SYSTEMS:  CONSTITUTIONAL: No weakness, fevers or chills diaphoresis   EYES/ENT: No visual changes;  No dysphagia  NECK: No pain or stiffness  RESPIRATORY: No cough, wheezing, hemoptysis; + shortness of breath  CARDIOVASCULAR: +chest pain or palpitations; No lower extremity edema  GASTROINTESTINAL: No abdominal or epigastric pain. No nausea, vomiting, or hematemesis; No diarrhea or constipation. No melena or hematochezia.  BACK: + back pain  GENITOURINARY: No dysuria, frequency or hematuria  NEUROLOGICAL: No numbness or weakness  SKIN: No itching, burning, rashes, or lesions   All other review of systems is negative unless indicated above.    Physical Exam:  T(F): 97.8 (09-19), Max: 97.8 (09-19)  HR: 67 (09-19) (66 - 67)  BP: 134/69 (09-19) (129/68 - 134/69)  RR: 18 (09-19)  SpO2: 100% (09-19)      GENERAL: No acute distress, well-developed elderly male  HEAD:  Atraumatic, Normocephalic  ENT: EOMI, PERRLA, conjunctiva and sclera clear, Neck supple, No JVD, moist mucosa  CHEST/LUNG: Clear to auscultation bilaterally; No wheeze, equal breath sounds bilaterally   BACK: No spinal tenderness  HEART: Regular rate and rhythm; No murmurs, rubs, or gallops  ABDOMEN: Soft, Nontender, Nondistended; Bowel sounds present  EXTREMITIES:  No clubbing, cyanosis, or edema  PSYCH: Nl behavior, nl affect  NEUROLOGY: AAOx3, non-focal, cranial nerves intact  SKIN: Normal color, No rashes or lesions    ECG: NSR 60's T-wave inversion V4-V6, Present on ECG 11/17      Stress Testing: < from: Nuclear Stress Test, Pharmacologic (04.24.12 @ 10:30) >  ECG ABNORMALITIES DURING/AFTER STRESS:   ST Changes:ST Depression: 3 mm horizontal in leads II ,  III, aVF, V4, V5, V6 started at 06:00 min of exercise at  HR of 102 and persisted 05:00 min into post infusion.  All Changes resolved by 4 minutes of recovery.  ------------------------------------------------------------------------  NEW ARRHYTHMIAS DEVELOPED DURING/AFTER STRESS:  None  ------------------------------------------------------------------------  STRESS TEST IMPRESSIONS:  Symptom: no chest pain.  HR Response: Appropriate.  BP Response: Appropriate.  Heart Rhythm: Sinus Bradycardia - 55 BPM.  ECG Changes: ST Depression: 3 mm horizontal in leads II ,  III, aVF, V4, V5, V6 started at 06:00 min of exercise at  HR of 102 and persisted 05:00 min into post infusion.  All Changes resolved by 4 minutes of recovery.  Arrhythmia: None.  ------------------------------------------------------------------------  PROCEDURE:  12.5 mCi of Tc 99m Tetrofosmin were injected during stress  protocol. Approximately 45 minutes later, tomographic  images were obtained in a 180 degree arc from right  anterior oblique to left anterior oblique with 64 stops.  The tomographic slices were reconstructed in3 orthogonal  planes (short axis, horizontal long axis and vertical long  axis).  Interpretation was performed both by visual and  quantitative analysis.  Stress images were acquired using CZT-based system with  pinhole collimation ([employer] c, [employer]),  and reconstructed using MLEM algorithm.  ------------------------------------------------------------------------  NUCLEAR FINDINGS:  Review of raw data shows: The study is of good technical  quality.  The left ventricle was normalin size. Normal myocardial  perfusion scan,with no evidence of infarction or inducible  ischemia.  ------------------------------------------------------------------------  GATED ANALYSIS:  Post-stress gated wall motion analysis was performed (LVEF  > 70%;LVEDV = 77 ml.), revealing normal LV function. RV  function appears normal.  ------------------------------------------------------------------------  IMPRESSIONS:Normal Study  * The left ventricle was normal in size.  * Tracer uptake was homogeneous throughout the left  ventricle.  * Normal study; no evidence for myocardial infarction or  ischemia.  * Gated wall motion analysis was performed, and shows  normal wall motion.    < end of copied text >      Cath: < from: Cardiac Cath Lab - Adult (11.13.17 @ 14:11) >  VENTRICLES: EF estimated was 60 %.  CORONARY VESSELS: The coronary circulation is right dominant.  LM:   --  LM: Normal.  LAD:   --  Mid LAD: There was a tubular 100 % stenosis at the site of a  prior angioplasty with stent placement. There was ROSEANNA grade 0 flow  through the vessel (no flow). This is a likely culprit for the patient's  clinical presentation. It appears amenable to percutaneous intervention.  CX:   -- Circumflex: Angiography showed minor luminal irregularities with  no flow limiting lesions.  RCA:   --  Mid RCA: There was a 40 % stenosis.  --  Distal RCA: There was a 40 % stenosis.  COMPLICATIONS: There were no complications.  DIAGNOSTIC RECOMMENDATIONS: GIven the recurrent angina, the mLAD  should  be revascularized with PCI via an antegrade approach.  Will discuss with family and determine optimal timing given the holidays.  INTERVENTIONAL RECOMMENDATIONS: GIven the recurrent angina, themLAD   should be revascularized with PCI via an antegrade approach.  Will discuss with family and determine optimal timing given the holidays.    < end of copied text >      Imaging:    Labs: Personally reviewed                        12.5   6.4   )-----------( 256      ( 19 Sep 2018 10:02 )             37.2     09-19    134<L>  |  98  |  20  ----------------------------<  285<H>  4.2   |  23  |  1.43<H>    Ca    9.9      19 Sep 2018 10:02    TPro  7.2  /  Alb  4.6  /  TBili  0.2  /  DBili  x   /  AST  23  /  ALT  19  /  AlkPhos  46  09-19    PT/INR - ( 19 Sep 2018 10:02 )   PT: 11.9 sec;   INR: 1.10 ratio         PTT - ( 19 Sep 2018 10:02 )  PTT:27.9 sec  CARDIAC MARKERS ( 19 Sep 2018 10:02 )  x     / x     / x     / x     / 4.0 ng/mL Chief Complaint: Chest Pain     HPI: 72M PMHx of DM (A1c 7.0), HTN, HLD, CAD s/p stent mid-LAD '03 w known critical 100% occluded LAD at site of stent, visualized on 11/17 Cath, presenting with chest pain. Daughter is at bedside who is an Internist to corroborate. Pt awoke from sleep approximately 3am w new left side chest pressure 3/10 in intensity, radiating to left arm, back, associated with dyspnea, exertion, diaphoresis. Chest pain spontaneously resolved but reoccurred at 5AM; pt took 2 Nitroglycerin tablets at that time that did improved the chest discomfort. Pt presented to the ED for further evaluation, was Aspirin loaded and was given 2 mg Morphine for chest discomfort. Pt seen and examined at bedside, currently comfortable with no reoccurrence of chest pain. Denies any nausea/vomiting, fevers, coughing, pleuritic component. Denies recent illness or trauma to the chest. Of note, pt seen by cardiologist at [facility] shortly after 11/17, recommended CABG at the time however family refusing intervention. Currently followed by Rashid Yost at [facility], last seen in January for well visit.     PMHx:   CAD (coronary artery disease)  Constipation  Hyperthyroidism  Diabetes  Hypertension  Dyslipidemia    PSHx:   H/O cardiac catheterization    Allergies:  No Known Allergies    Home Meds:   Fenofibrate   Crestor 20 mg  Bystolic 2.5 mg  ASA 81 mg   Metformin 500 mg   Nitroglycerin PRN   Methimazole  Linzess  Centrum   Vitamin D3  Glucosamine  Flomax     Current Medications:   aspirin  chewable 324 milliGRAM(s) Oral daily    FAMILY HISTORY:  Family history of heart attack (Sibling)    Social History:  Smoking History: Denies  Alcohol Use:  Denies  Drug Use:  Denies    REVIEW OF SYSTEMS:  CONSTITUTIONAL: No weakness, fevers or chills diaphoresis   EYES/ENT: No visual changes;  No dysphagia  NECK: No pain or stiffness  RESPIRATORY: No cough, wheezing, hemoptysis; + shortness of breath  CARDIOVASCULAR: +chest pain. No palpitations; No lower extremity edema  GASTROINTESTINAL: No abdominal or epigastric pain. No nausea, vomiting, or hematemesis; No diarrhea or constipation. No melena or hematochezia.  BACK: + back pain  GENITOURINARY: No dysuria, frequency or hematuria  NEUROLOGICAL: No numbness or weakness  SKIN: No itching, burning, rashes, or lesions   All other review of systems is negative unless indicated above.    Physical Exam:  T(F): 97.8 (09-19), Max: 97.8 (09-19)  HR: 67 (09-19) (66 - 67)  BP: 134/69 (09-19) (129/68 - 134/69)  RR: 18 (09-19)  SpO2: 100% (09-19)    GENERAL: No acute distress, well-developed elderly male  HEAD:  Atraumatic, Normocephalic  ENT: EOMI, PERRLA, conjunctiva and sclera clear, Neck supple, No JVD, moist mucosa  CHEST/LUNG: Clear to auscultation bilaterally; No wheeze, equal breath sounds bilaterally   BACK: No spinal tenderness  HEART: Regular rate and rhythm; No murmurs, rubs, or gallops  ABDOMEN: Soft, Nontender, Nondistended; Bowel sounds present  EXTREMITIES:  No clubbing, cyanosis, or edema  PSYCH: Nl behavior, nl affect  NEUROLOGY: AAOx3, non-focal, cranial nerves intact  SKIN: Normal color, No rashes or lesions    ECG: NSR 60's T-wave inversion V4-V6, Present on ECG 11/17    Stress Testing: < from: Nuclear Stress Test, Pharmacologic (04.24.12 @ 10:30) >  ECG ABNORMALITIES DURING/AFTER STRESS:   ST Changes:ST Depression: 3 mm horizontal in leads II ,  III, aVF, V4, V5, V6 started at 06:00 min of exercise at  HR of 102 and persisted 05:00 min into post infusion.  All Changes resolved by 4 minutes of recovery.  ------------------------------------------------------------------------  NEW ARRHYTHMIAS DEVELOPED DURING/AFTER STRESS:  None  ------------------------------------------------------------------------  STRESS TEST IMPRESSIONS:  Symptom: no chest pain.  HR Response: Appropriate.  BP Response: Appropriate.  Heart Rhythm: Sinus Bradycardia - 55 BPM.  ECG Changes: ST Depression: 3 mm horizontal in leads II ,  III, aVF, V4, V5, V6 started at 06:00 min of exercise at  HR of 102 and persisted 05:00 min into post infusion.  All Changes resolved by 4 minutes of recovery.  Arrhythmia: None.  ------------------------------------------------------------------------  PROCEDURE:  12.5 mCi of Tc 99m Tetrofosmin were injected during stress  protocol. Approximately 45 minutes later, tomographic  images were obtained in a 180 degree arc from right  anterior oblique to left anterior oblique with 64 stops.  The tomographic slices were reconstructed in3 orthogonal  planes (short axis, horizontal long axis and vertical long  axis).  Interpretation was performed both by visual and  quantitative analysis.  Stress images were acquired using CZT-based system with  pinhole collimation ([place] [address] c, [place]),  and reconstructed using MLEM algorithm.  ------------------------------------------------------------------------  NUCLEAR FINDINGS:  Review of raw data shows: The study is of good technical  quality.  The left ventricle was normalin size. Normal myocardial  perfusion scan,with no evidence of infarction or inducible  ischemia.  ------------------------------------------------------------------------  GATED ANALYSIS:  Post-stress gated wall motion analysis was performed (LVEF  > 70%;LVEDV = 77 ml.), revealing normal LV function. RV  function appears normal.  ------------------------------------------------------------------------  IMPRESSIONS:Normal Study  * The left ventricle was normal in size.  * Tracer uptake was homogeneous throughout the left  ventricle.  * Normal study; no evidence for myocardial infarction or  ischemia.  * Gated wall motion analysis was performed, and shows  normal wall motion.    < end of copied text >    Cath: < from: Cardiac Cath Lab - Adult (11.13.17 @ 14:11) >  VENTRICLES: EF estimated was 60 %.  CORONARY VESSELS: The coronary circulation is right dominant.  LM:   --  LM: Normal.  LAD:   --  Mid LAD: There was a tubular 100 % stenosis at the site of a  prior angioplasty with stent placement. There was ROSEANNA grade 0 flow  through the vessel (no flow). This is a likely culprit for the patient's  clinical presentation. It appears amenable to percutaneous intervention.  CX:   -- Circumflex: Angiography showed minor luminal irregularities with  no flow limiting lesions.  RCA:   --  Mid RCA: There was a 40 % stenosis.  --  Distal RCA: There was a 40 % stenosis.  COMPLICATIONS: There were no complications.  DIAGNOSTIC RECOMMENDATIONS: GIven the recurrent angina, the mLAD  should  be revascularized with PCI via an antegrade approach.  Will discuss with family and determine optimal timing given the holidays.  INTERVENTIONAL RECOMMENDATIONS: GIven the recurrent angina, themLAD   should be revascularized with PCI via an antegrade approach.  Will discuss with family and determine optimal timing given the holidays.    < end of copied text >    Imaging:    Labs: Personally reviewed                        12.5   6.4   )-----------( 256      ( 19 Sep 2018 10:02 )             37.2     09-19    134<L>  |  98  |  20  ----------------------------<  285<H>  4.2   |  23  |  1.43<H>    Ca    9.9      19 Sep 2018 10:02    TPro  7.2  /  Alb  4.6  /  TBili  0.2  /  DBili  x   /  AST  23  /  ALT  19  /  AlkPhos  46  09-19    PT/INR - ( 19 Sep 2018 10:02 )   PT: 11.9 sec;   INR: 1.10 ratio         PTT - ( 19 Sep 2018 10:02 )  PTT:27.9 sec  CARDIAC MARKERS ( 19 Sep 2018 10:02 )  x     / x     / x     / x     / 4.0 ng/mL

## 2018-09-19 NOTE — CHART NOTE - NSCHARTNOTEFT_GEN_A_CORE
[facility]  Patient is a 72y old  Male who presents with a chief complaint of chest pain. (19 Sep 2018 12:56)       Event Summary:  Called by patient's daughter to report patient complaining of chest pain.  Patient seen and examined at the bedside.  He is alert, standing up, brushing his teeth.  Per patient, he states that he has 1/10,          Vital Signs Last 24 Hrs  T(C): 36.3 (19 Sep 2018 20:50), Max: 37 (19 Sep 2018 16:21)  T(F): 97.4 (19 Sep 2018 20:50), Max: 98.6 (19 Sep 2018 16:21)  HR: 58 (19 Sep 2018 20:50) (55 - 67)  BP: 130/68 (19 Sep 2018 20:50) (106/51 - 134/69)  BP(mean): --  RR: 18 (19 Sep 2018 20:50) (16 - 18)  SpO2: 98% (19 Sep 2018 20:50) (95% - 100%)        Plan:              Shilpa Fisher PA-C  Medicine Department [facility]  Patient is a 72y old  Male who presents with a chief complaint of chest pain. (19 Sep 2018 12:56)       Event Summary:  Called by patient's daughter to report patient complaining of chest pain.  Patient seen and examined at the bedside.  He is alert, standing up, brushing his teeth.  Per patient, he states that he has 1/10, "sore," non-radiating chest         Vital Signs Last 24 Hrs  T(C): 36.3 (19 Sep 2018 20:50), Max: 37 (19 Sep 2018 16:21)  T(F): 97.4 (19 Sep 2018 20:50), Max: 98.6 (19 Sep 2018 16:21)  HR: 58 (19 Sep 2018 20:50) (55 - 67)  BP: 130/68 (19 Sep 2018 20:50) (106/51 - 134/69)  BP(mean): --  RR: 18 (19 Sep 2018 20:50) (16 - 18)  SpO2: 98% (19 Sep 2018 20:50) (95% - 100%)        Plan:              Shilpa Fisher PA-C  Medicine Department [facility]  Patient is a 72y old  Male who presents with a chief complaint of chest pain. (19 Sep 2018 12:56)       Event Summary:  Called by patient's daughter to report patient complaining of chest pain.  Patient seen and examined at the bedside.  He is alert, standing up, brushing his teeth.  Per patient, he states that he has 1/10, "sore," non-radiating, left sided chest pain.  He states that he has pain when he touches his chest that he has pain.  Does not endorse any other associated symptoms.  Denies dizziness, headache, palpitations, shortness of breath, abdominal pain, nausea, vomiting and diarrhea.      Vital Signs Last 24 Hrs  T(C): 36.3 (19 Sep 2018 20:50), Max: 37 (19 Sep 2018 16:21)  T(F): 97.4 (19 Sep 2018 20:50), Max: 98.6 (19 Sep 2018 16:21)  HR: 58 (19 Sep 2018 20:50) (55 - 67)  BP: 130/68 (19 Sep 2018 20:50) (106/51 - 134/69)  RR: 18 (19 Sep 2018 20:50) (16 - 18)  SpO2: 98% (19 Sep 2018 20:50) (95% - 100%)      PHYSICAL EXAM:  GENERAL: Sitting up, in no acute distress.  Appears comfortable, non-diaphoretic  PSYCH: AAOx4  HEART: +S1/S2.  Regular rate and rhythm.  No murmurs, rubs or gallops  THORAX: +tenderness to palpation of left side of chest  CHEST/LUNG: Breath sounds clear to auscultation bilaterally.  No wheezes, rales, rhonchi or crackles  HEART: +S1/S2.  Regular rate and rhythm.  No murmurs, rubs or gallops  ABDOMEN: Bowel sounds present in all 4 quadrants.  Soft, Nontender, Nondistended      HPI:  Patient 72 year old male with a PMHx of DM2 (HgA1C 7.0), HTN, HLD and CAD (S/P stent mid-LAD '03 w known critical 100% occluded LAD at site of stent, visualized on 11/17 Cath, presenting with chest pain. Daughter is at bedside who is an Internist to corroborate. Pt awoke from sleep approximately 3am w new left side chest pressure 3/10 in intensity, radiating to left arm, back, associated with dyspnea, exertion, diaphoresis. Chest pain spontaneously resolved but reoccurred at 5AM; pt took 2 Nitroglycerin tablets at that time that did improved the chest discomfort. Pt presented to the ED for further evaluation, was Aspirin loaded and was given 2 mg Morphine for chest discomfort. Pt seen and examined at bedside, currently comfortable with no reoccurrence of chest pain. Denies any nausea/vomiting, fevers, coughing, pleuritic component. Denies recent illness or trauma to the chest. Of note, pt seen by cardiologist at [place] shortly after 11/17, recommended CABG at the time however family refusing intervention. Currently followed by Rashid Yost at [facility], last seen in January for well visit. (19 Sep 2018 21:16)      Plan:              Shilpa Fisher PA-C  Medicine Department [facility]  Patient is a 72 year old male who presents with a chief complaint of chest pain. (19 Sep 2018 12:56)       Event Summary:  Called by patient's daughter to report patient complaining of chest pain.  Patient seen and examined at the bedside.  He is alert, standing up, brushing his teeth.  Per patient, he states that he has 1/10, "sore," non-radiating, left sided chest pain.  He states that he has pain when he touches his chest that he has pain.  Does not endorse any other associated symptoms.  Denies dizziness, headache, palpitations, shortness of breath, abdominal pain, nausea, vomiting and diarrhea.      Vital Signs Last 24 Hrs  T(C): 36.3 (19 Sep 2018 20:50), Max: 37 (19 Sep 2018 16:21)  T(F): 97.4 (19 Sep 2018 20:50), Max: 98.6 (19 Sep 2018 16:21)  HR: 58 (19 Sep 2018 20:50) (55 - 67)  BP: 130/68 (19 Sep 2018 20:50) (106/51 - 134/69)  RR: 18 (19 Sep 2018 20:50) (16 - 18)  SpO2: 98% (19 Sep 2018 20:50) (95% - 100%)      PHYSICAL EXAM:  GENERAL: Sitting up, in no acute distress.  Appears comfortable, non-diaphoretic  PSYCH: AAOx4  HEART: +S1/S2.  Regular rate and rhythm.  No murmurs, rubs or gallops  THORAX: +tenderness to palpation of left side of chest  CHEST/LUNG: Breath sounds clear to auscultation bilaterally.  No wheezes, rales, rhonchi or crackles  HEART: +S1/S2.  Regular rate and rhythm.  No murmurs, rubs or gallops  ABDOMEN: Bowel sounds present in all 4 quadrants.  Soft, Nontender, Nondistended      HPI:  Patient 72 year old male with a PMHx of DM2 (HgA1C 7.0), HTN, HLD and CAD (S/P stent mid-LAD in 2003 with known critical 100% occluded LAD at site of stent visualized on 11/17 cath) who presented with chest pain. (19 Sep 2018 21:16)  Now with chest pain.      PLAN: Chest pain - R/O ACS vs. musculoskeletal  - STAT EKG ordered  - STAT cardiac enzymes ordered  - Tylenol x1 dose ordered as pain possibly from musculoskeletal etiology  - Cardiology following patient  - Continue with                 Shilpa Fisher PA-C  Medicine Department [facility]  Patient is a 72 year old male who presents with a chief complaint of chest pain. (19 Sep 2018 12:56)       Event Summary:  Called by patient's daughter to report patient complaining of chest pain.  Patient seen and examined at the bedside.  He is alert, standing up, brushing his teeth.  Per patient, he states that he has 1/10, "sore," non-radiating, left sided chest pain.  He states that he has pain when he touches his chest that he has pain.  Does not endorse any other associated symptoms.  Denies dizziness, headache, palpitations, shortness of breath, abdominal pain, nausea, vomiting and diarrhea.      Vital Signs Last 24 Hrs  T(C): 36.3 (19 Sep 2018 20:50), Max: 37 (19 Sep 2018 16:21)  T(F): 97.4 (19 Sep 2018 20:50), Max: 98.6 (19 Sep 2018 16:21)  HR: 58 (19 Sep 2018 20:50) (55 - 67)  BP: 130/68 (19 Sep 2018 20:50) (106/51 - 134/69)  RR: 18 (19 Sep 2018 20:50) (16 - 18)  SpO2: 98% (19 Sep 2018 20:50) (95% - 100%)      PHYSICAL EXAM:  GENERAL: Sitting up, in no acute distress.  Appears comfortable, non-diaphoretic  PSYCH: AAOx4  HEART: +S1/S2.  Regular rate and rhythm.  No murmurs, rubs or gallops  THORAX: +tenderness to palpation of left side of chest  CHEST/LUNG: Breath sounds clear to auscultation bilaterally.  No wheezes, rales, rhonchi or crackles  HEART: +S1/S2.  Regular rate and rhythm.  No murmurs, rubs or gallops  ABDOMEN: Bowel sounds present in all 4 quadrants.  Soft, Nontender, Nondistended      HPI:  Patient 72 year old male with a PMHx of DM2 (HgA1C 7.0), HTN, HLD and CAD (S/P stent mid-LAD in 2003 with known critical 100% occluded LAD at site of stent visualized on 11/17 cath) who presented with chest pain. (19 Sep 2018 21:16)  Now with chest pain.      PLAN: Chest pain - R/O ACS vs. musculoskeletal  - STAT EKG ordered  - STAT cardiac enzymes ordered  - Tylenol x1 dose ordered as pain possibly from musculoskeletal etiology  - Cardiology following patient  - Continue with Lopressor, Lipitor and Fenofibrate  - Will continue to monitor closely  - Primary team to follow up in AM      #[id number]  Shilpa Fisher PA-C  Medicine Department      Time spent with patient: 25 minutes [facility]  Patient is a 72 year old male who presents with a chief complaint of chest pain. (19 Sep 2018 12:56)       Event Summary:  Called by patient's daughter to report patient complaining of chest pain.  Patient seen and examined at the bedside.  He is alert, standing up, brushing his teeth.  Per patient, he states that he has 1/10, "sore," non-radiating, left sided chest pain.  He states that he has pain when he touches his chest that he has pain.  Does not endorse any other associated symptoms.  Denies dizziness, headache, palpitations, shortness of breath, abdominal pain, nausea, vomiting and diarrhea.      Vital Signs Last 24 Hrs  T(C): 36.3 (19 Sep 2018 20:50), Max: 37 (19 Sep 2018 16:21)  T(F): 97.4 (19 Sep 2018 20:50), Max: 98.6 (19 Sep 2018 16:21)  HR: 58 (19 Sep 2018 20:50) (55 - 67)  BP: 130/68 (19 Sep 2018 20:50) (106/51 - 134/69)  RR: 18 (19 Sep 2018 20:50) (16 - 18)  SpO2: 98% (19 Sep 2018 20:50) (95% - 100%)      PHYSICAL EXAM:  GENERAL: Sitting up, in no acute distress.  Appears comfortable, non-diaphoretic  PSYCH: AAOx4  HEART: +S1/S2.  Regular rate and rhythm.  No murmurs, rubs or gallops  THORAX: +tenderness to palpation of left side of chest  CHEST/LUNG: Breath sounds clear to auscultation bilaterally.  No wheezes, rales, rhonchi or crackles  ABDOMEN: Bowel sounds present in all 4 quadrants.  Soft, Nontender, Nondistended      HPI:  Patient 72 year old male with a PMHx of DM2 (HgA1C 7.0), HTN, HLD and CAD (S/P stent mid-LAD in 2003 with known critical 100% occluded LAD at site of stent visualized on 11/17 cath) who presented with chest pain. (19 Sep 2018 21:16)  Now with chest pain.      PLAN: Chest pain - R/O ACS vs. musculoskeletal  - STAT EKG ordered  - STAT cardiac enzymes ordered  - Tylenol x1 dose ordered as pain possibly from musculoskeletal etiology  - Cardiology following patient  - Continue with Lopressor, Lipitor and Fenofibrate  - Will continue to monitor closely  - Primary team to follow up in AM      #[id number]  Shilpa Fisher PA-C  Medicine Department      Time spent with patient: 25 minutes [facility]  Patient is a 72 year old male who presents with a chief complaint of chest pain. (19 Sep 2018 12:56)       Event Summary:  Called by patient's daughter to report patient complaining of chest pain.  Patient seen and examined at the bedside.  He is alert, standing up, brushing his teeth.  Per patient, he states that he has 1/10, "sore," non-radiating, left sided chest pain.  He states that he has pain when he touches his chest that he has pain.  Does not endorse any other associated symptoms.  Denies dizziness, headache, palpitations, shortness of breath, abdominal pain, nausea, vomiting and diarrhea.      Vital Signs Last 24 Hrs  T(C): 36.3 (19 Sep 2018 20:50), Max: 37 (19 Sep 2018 16:21)  T(F): 97.4 (19 Sep 2018 20:50), Max: 98.6 (19 Sep 2018 16:21)  HR: 58 (19 Sep 2018 20:50) (55 - 67)  BP: 130/68 (19 Sep 2018 20:50) (106/51 - 134/69)  RR: 18 (19 Sep 2018 20:50) (16 - 18)  SpO2: 98% (19 Sep 2018 20:50) (95% - 100%)      PHYSICAL EXAM:  GENERAL: Sitting up, in no acute distress.  Appears comfortable, non-diaphoretic  PSYCH: AAOx4  HEART: +S1/S2.  Regular rate and rhythm.  No murmurs, rubs or gallops  THORAX: +tenderness to palpation of left side of chest  CHEST/LUNG: Breath sounds clear to auscultation bilaterally.  No wheezes, rales, rhonchi or crackles  ABDOMEN: Bowel sounds present in all 4 quadrants.  Soft, Nontender, Nondistended      HPI:  Patient 72 year old male with a PMHx of DM2 (HgA1C 7.0), HTN, HLD and CAD (S/P stent mid-LAD in 2003 with known critical 100% occluded LAD at site of stent visualized on 11/17 cath) who presented with chest pain. (19 Sep 2018 21:16)  Now with chest pain.      PLAN: Chest pain - R/O ACS vs. musculoskeletal  - STAT EKG ordered showing sinus bradycardia with 1st degree AV block @53bpm.  No acute changes from prior  - STAT cardiac enzymes ordered  - Tylenol x1 dose ordered as pain possibly from musculoskeletal etiology  - Cardiology following patient  - Continue with Lopressor, Lipitor and Fenofibrate  - Will continue to monitor closely  - Primary team to follow up in AM      #[id number]  Shilpa Fisher PA-C  Medicine Department      Time spent with patient: 25 minutes [facility]  Patient is a 72 year old male who presents with a chief complaint of chest pain. (19 Sep 2018 12:56)       Event Summary:  Called by patient's daughter to report patient complaining of chest pain.  Patient seen and examined at the bedside.  He is alert, standing up, brushing his teeth.  Per patient, he states that he has 1/10, "sore," non-radiating, left sided chest pain.  He states that he has pain when he touches his chest that he has pain.  Does not endorse any other associated symptoms.  Denies dizziness, headache, palpitations, shortness of breath, abdominal pain, nausea, vomiting and diarrhea.      Vital Signs Last 24 Hrs  T(C): 36.3 (19 Sep 2018 20:50), Max: 37 (19 Sep 2018 16:21)  T(F): 97.4 (19 Sep 2018 20:50), Max: 98.6 (19 Sep 2018 16:21)  HR: 58 (19 Sep 2018 20:50) (55 - 67)  BP: 130/68 (19 Sep 2018 20:50) (106/51 - 134/69)  RR: 18 (19 Sep 2018 20:50) (16 - 18)  SpO2: 98% (19 Sep 2018 20:50) (95% - 100%)      PHYSICAL EXAM:  GENERAL: Sitting up, in no acute distress.  Appears comfortable, non-diaphoretic  PSYCH: AAOx4  HEART: +S1/S2.  Regular rate and rhythm.  No murmurs, rubs or gallops  THORAX: +tenderness to palpation of left side of chest  CHEST/LUNG: Breath sounds clear to auscultation bilaterally.  No wheezes, rales, rhonchi or crackles  ABDOMEN: Bowel sounds present in all 4 quadrants.  Soft, Nontender, Nondistended      HPI:  Patient 72 year old male with a PMHx of DM2 (HgA1C 7.0), HTN, HLD and CAD (S/P stent mid-LAD in 2003 with known critical 100% occluded LAD at site of stent visualized on 11/17 cath) who presented with chest pain. (19 Sep 2018 21:16)  Now with chest pain.      PLAN: Chest pain - R/O ACS vs. musculoskeletal  - STAT EKG ordered showing sinus bradycardia with 1st degree AV block @53bpm.  No acute changes from prior  - STAT cardiac enzymes ordered  - Tylenol x1 dose ordered as pain possibly from musculoskeletal etiology  - Cardiology following patient  - Continue with Aspirin, Lopressor, Lipitor and Fenofibrate  - Will continue to monitor closely  - Primary team to follow up in AM      #[id number]  Shilpa Fisher PA-C  Medicine Department      Time spent with patient: 25 minutes [facility]  Patient is a 72 year old male who presents with a chief complaint of chest pain. (19 Sep 2018 12:56)       Event Summary:  Called by patient's daughter to report patient complaining of chest pain.  Patient seen and examined at the bedside.  He is alert, standing up, brushing his teeth.  Per patient, he states that he has 1/10, "sore," non-radiating, left sided chest pain.  He states that he has pain when he touches his chest that he has pain.  Does not endorse any other associated symptoms.  Denies dizziness, headache, palpitations, shortness of breath, abdominal pain, nausea, vomiting and diarrhea.      Vital Signs Last 24 Hrs  T(C): 36.3 (19 Sep 2018 20:50), Max: 37 (19 Sep 2018 16:21)  T(F): 97.4 (19 Sep 2018 20:50), Max: 98.6 (19 Sep 2018 16:21)  HR: 58 (19 Sep 2018 20:50) (55 - 67)  BP: 130/68 (19 Sep 2018 20:50) (106/51 - 134/69)  RR: 18 (19 Sep 2018 20:50) (16 - 18)  SpO2: 98% (19 Sep 2018 20:50) (95% - 100%)      CARDIAC ENZYMES:  CARDIAC MARKERS ( 19 Sep 2018 22:45 )  x     / x     / 130 U/L / x     / 4.0 ng/mL  CARDIAC MARKERS ( 19 Sep 2018 10:02 )  x     / x     / x     / x     / 4.0 ng/mL    Troponin T, High Sensitivity (09.19.18 @ 22:45)    Troponin T, High Sensitivity Result: 7: Rapid upward or downward changes in high-sensitivity troponin levels  suggest acute myocardial injury. Renal impairment may cause sustained  troponin elevations.  Normal: <6 - 14 ng/L  Indeterminate: 15-51 ng/L  Elevated: > 51 ng/L  See http://labs/test/TROPTHS on the [facility] intranet for more  information ng/L      PHYSICAL EXAM:  GENERAL: Sitting up, in no acute distress.  Appears comfortable, non-diaphoretic  PSYCH: AAOx4  HEART: +S1/S2.  Regular rate and rhythm.  No murmurs, rubs or gallops  THORAX: +tenderness to palpation of left side of chest  CHEST/LUNG: Breath sounds clear to auscultation bilaterally.  No wheezes, rales, rhonchi or crackles  ABDOMEN: Bowel sounds present in all 4 quadrants.  Soft, Nontender, Nondistended      HPI:  Patient 72 year old male with a PMHx of DM2 (HgA1C 7.0), HTN, HLD and CAD (S/P stent mid-LAD in 2003 with known critical 100% occluded LAD at site of stent visualized on 11/17 cath) who presented with chest pain. (19 Sep 2018 21:16)  Now with chest pain.      PLAN: Chest pain - R/O ACS vs. musculoskeletal  - STAT EKG ordered showing sinus bradycardia with 1st degree AV block @53bpm.  No acute changes from prior  - STAT cardiac enzymes ordered - noted to be negative  - Tylenol x1 dose ordered as pain possibly from musculoskeletal etiology  - Cardiology following patient  - Continue with Aspirin, Lopressor, Lipitor and Fenofibrate  - Will continue to monitor closely  - Primary team to follow up in AM      #[id number]  Shilpa Fisher PA-C  Medicine Department      Time spent with patient: 25 minutes

## 2018-09-19 NOTE — H&P ADULT - NSHPLABSRESULTS_GEN_ALL_CORE
12.5   6.4   )-----------( 256      ( 19 Sep 2018 10:02 )             37.2   09-19    134<L>  |  98  |  20  ----------------------------<  285<H>  4.2   |  23  |  1.43<H>    Ca    9.9      19 Sep 2018 10:02    TPro  7.2  /  Alb  4.6  /  TBili  0.2  /  DBili  x   /  AST  23  /  ALT  19  /  AlkPhos  46  09-19  CARDIAC MARKERS ( 19 Sep 2018 22:45 )  x     / x     / 130 U/L / x     / 4.0 ng/mL  CARDIAC MARKERS ( 19 Sep 2018 10:02 )  x     / x     / x     / x     / 4.0 ng/mL

## 2018-09-19 NOTE — ED PROVIDER NOTE - OBJECTIVE STATEMENT
72 year old male with PMHx of DM, HTN, HLD, known critical 100% occluded LAD, CAD s/p stenting, presenting with chest pain starting at 2AM this morning. Patient reports left sided chest pressure 3/10 in intensity, radiating to left arm, back, associated with dyspnea, exertion, diaphoresis. Chest pain spontaneously resolved but reoccurred at 6AM prompting visit to ED. Reports now 2/10 chest pressure. Denies any nausea/vomiting, fevers, coughing, pleuritic component. He took a nitroglycerin SL x 2 which did not resolve his chest pain.

## 2018-09-20 ENCOUNTER — TRANSCRIPTION ENCOUNTER (OUTPATIENT)
Age: 73
End: 2018-09-20

## 2018-09-20 VITALS
OXYGEN SATURATION: 98 % | SYSTOLIC BLOOD PRESSURE: 118 MMHG | DIASTOLIC BLOOD PRESSURE: 64 MMHG | HEART RATE: 57 BPM | RESPIRATION RATE: 18 BRPM | TEMPERATURE: 98 F

## 2018-09-20 DIAGNOSIS — R07.9 CHEST PAIN, UNSPECIFIED: ICD-10-CM

## 2018-09-20 DIAGNOSIS — I25.10 ATHEROSCLEROTIC HEART DISEASE OF NATIVE CORONARY ARTERY WITHOUT ANGINA PECTORIS: ICD-10-CM

## 2018-09-20 DIAGNOSIS — E05.90 THYROTOXICOSIS, UNSPECIFIED WITHOUT THYROTOXIC CRISIS OR STORM: ICD-10-CM

## 2018-09-20 DIAGNOSIS — E11.9 TYPE 2 DIABETES MELLITUS WITHOUT COMPLICATIONS: ICD-10-CM

## 2018-09-20 DIAGNOSIS — I10 ESSENTIAL (PRIMARY) HYPERTENSION: ICD-10-CM

## 2018-09-20 LAB
ALBUMIN SERPL ELPH-MCNC: 4.1 G/DL — SIGNIFICANT CHANGE UP (ref 3.3–5)
ALP SERPL-CCNC: 35 U/L — LOW (ref 40–120)
ALT FLD-CCNC: 17 U/L — SIGNIFICANT CHANGE UP (ref 10–45)
ANION GAP SERPL CALC-SCNC: 11 MMOL/L — SIGNIFICANT CHANGE UP (ref 5–17)
AST SERPL-CCNC: 21 U/L — SIGNIFICANT CHANGE UP (ref 10–40)
BILIRUB SERPL-MCNC: 0.3 MG/DL — SIGNIFICANT CHANGE UP (ref 0.2–1.2)
BUN SERPL-MCNC: 17 MG/DL — SIGNIFICANT CHANGE UP (ref 7–23)
CALCIUM SERPL-MCNC: 9.6 MG/DL — SIGNIFICANT CHANGE UP (ref 8.4–10.5)
CHLORIDE SERPL-SCNC: 103 MMOL/L — SIGNIFICANT CHANGE UP (ref 96–108)
CO2 SERPL-SCNC: 24 MMOL/L — SIGNIFICANT CHANGE UP (ref 22–31)
CREAT SERPL-MCNC: 1.35 MG/DL — HIGH (ref 0.5–1.3)
GLUCOSE BLDC GLUCOMTR-MCNC: 134 MG/DL — HIGH (ref 70–99)
GLUCOSE BLDC GLUCOMTR-MCNC: 191 MG/DL — HIGH (ref 70–99)
GLUCOSE SERPL-MCNC: 122 MG/DL — HIGH (ref 70–99)
HCT VFR BLD CALC: 37.2 % — LOW (ref 39–50)
HGB BLD-MCNC: 12.4 G/DL — LOW (ref 13–17)
MCHC RBC-ENTMCNC: 26.7 PG — LOW (ref 27–34)
MCHC RBC-ENTMCNC: 33.4 GM/DL — SIGNIFICANT CHANGE UP (ref 32–36)
MCV RBC AUTO: 79.9 FL — LOW (ref 80–100)
PLATELET # BLD AUTO: 249 K/UL — SIGNIFICANT CHANGE UP (ref 150–400)
POTASSIUM SERPL-MCNC: 4.1 MMOL/L — SIGNIFICANT CHANGE UP (ref 3.5–5.3)
POTASSIUM SERPL-SCNC: 4.1 MMOL/L — SIGNIFICANT CHANGE UP (ref 3.5–5.3)
PROT SERPL-MCNC: 6.6 G/DL — SIGNIFICANT CHANGE UP (ref 6–8.3)
RBC # BLD: 4.65 M/UL — SIGNIFICANT CHANGE UP (ref 4.2–5.8)
RBC # FLD: 12.1 % — SIGNIFICANT CHANGE UP (ref 10.3–14.5)
SODIUM SERPL-SCNC: 138 MMOL/L — SIGNIFICANT CHANGE UP (ref 135–145)
WBC # BLD: 6.8 K/UL — SIGNIFICANT CHANGE UP (ref 3.8–10.5)
WBC # FLD AUTO: 6.8 K/UL — SIGNIFICANT CHANGE UP (ref 3.8–10.5)

## 2018-09-20 PROCEDURE — 93005 ELECTROCARDIOGRAM TRACING: CPT

## 2018-09-20 PROCEDURE — 85610 PROTHROMBIN TIME: CPT

## 2018-09-20 PROCEDURE — 84484 ASSAY OF TROPONIN QUANT: CPT

## 2018-09-20 PROCEDURE — 71046 X-RAY EXAM CHEST 2 VIEWS: CPT

## 2018-09-20 PROCEDURE — 82962 GLUCOSE BLOOD TEST: CPT

## 2018-09-20 PROCEDURE — 96374 THER/PROPH/DIAG INJ IV PUSH: CPT

## 2018-09-20 PROCEDURE — G0378: CPT

## 2018-09-20 PROCEDURE — 85730 THROMBOPLASTIN TIME PARTIAL: CPT

## 2018-09-20 PROCEDURE — 82553 CREATINE MB FRACTION: CPT

## 2018-09-20 PROCEDURE — 99285 EMERGENCY DEPT VISIT HI MDM: CPT | Mod: 25

## 2018-09-20 PROCEDURE — 82550 ASSAY OF CK (CPK): CPT

## 2018-09-20 PROCEDURE — 85027 COMPLETE CBC AUTOMATED: CPT

## 2018-09-20 PROCEDURE — 80053 COMPREHEN METABOLIC PANEL: CPT

## 2018-09-20 RX ORDER — METHIMAZOLE 10 MG/1
0 TABLET ORAL
Qty: 0 | Refills: 0 | COMMUNITY

## 2018-09-20 RX ORDER — DEXTROSE 50 % IN WATER 50 %
12.5 SYRINGE (ML) INTRAVENOUS ONCE
Qty: 0 | Refills: 0 | Status: DISCONTINUED | OUTPATIENT
Start: 2018-09-20 | End: 2018-09-20

## 2018-09-20 RX ORDER — GLUCAGON INJECTION, SOLUTION 0.5 MG/.1ML
1 INJECTION, SOLUTION SUBCUTANEOUS ONCE
Qty: 0 | Refills: 0 | Status: DISCONTINUED | OUTPATIENT
Start: 2018-09-20 | End: 2018-09-20

## 2018-09-20 RX ORDER — METFORMIN HYDROCHLORIDE 850 MG/1
1 TABLET ORAL
Qty: 0 | Refills: 0 | COMMUNITY

## 2018-09-20 RX ORDER — DEXTROSE 50 % IN WATER 50 %
25 SYRINGE (ML) INTRAVENOUS ONCE
Qty: 0 | Refills: 0 | Status: DISCONTINUED | OUTPATIENT
Start: 2018-09-20 | End: 2018-09-20

## 2018-09-20 RX ORDER — ASPIRIN/CALCIUM CARB/MAGNESIUM 324 MG
1 TABLET ORAL
Qty: 0 | Refills: 0 | COMMUNITY

## 2018-09-20 RX ORDER — ASPIRIN/CALCIUM CARB/MAGNESIUM 324 MG
4 TABLET ORAL
Qty: 120 | Refills: 0
Start: 2018-09-20 | End: 2018-10-19

## 2018-09-20 RX ORDER — PANTOPRAZOLE SODIUM 20 MG/1
1 TABLET, DELAYED RELEASE ORAL
Qty: 0 | Refills: 0 | DISCHARGE
Start: 2018-09-20

## 2018-09-20 RX ORDER — ISOSORBIDE MONONITRATE 60 MG/1
1 TABLET, EXTENDED RELEASE ORAL
Qty: 30 | Refills: 0
Start: 2018-09-20 | End: 2018-10-19

## 2018-09-20 RX ORDER — INSULIN LISPRO 100/ML
VIAL (ML) SUBCUTANEOUS
Qty: 0 | Refills: 0 | Status: DISCONTINUED | OUTPATIENT
Start: 2018-09-20 | End: 2018-09-20

## 2018-09-20 RX ORDER — SODIUM CHLORIDE 9 MG/ML
1000 INJECTION, SOLUTION INTRAVENOUS
Qty: 0 | Refills: 0 | Status: DISCONTINUED | OUTPATIENT
Start: 2018-09-20 | End: 2018-09-20

## 2018-09-20 RX ORDER — DEXTROSE 50 % IN WATER 50 %
15 SYRINGE (ML) INTRAVENOUS ONCE
Qty: 0 | Refills: 0 | Status: DISCONTINUED | OUTPATIENT
Start: 2018-09-20 | End: 2018-09-20

## 2018-09-20 RX ORDER — INSULIN LISPRO 100/ML
VIAL (ML) SUBCUTANEOUS AT BEDTIME
Qty: 0 | Refills: 0 | Status: DISCONTINUED | OUTPATIENT
Start: 2018-09-20 | End: 2018-09-20

## 2018-09-20 RX ORDER — METHIMAZOLE 10 MG/1
1 TABLET ORAL
Qty: 0 | Refills: 0 | DISCHARGE
Start: 2018-09-20

## 2018-09-20 RX ORDER — TAMSULOSIN HYDROCHLORIDE 0.4 MG/1
1 CAPSULE ORAL
Qty: 0 | Refills: 0 | DISCHARGE
Start: 2018-09-20

## 2018-09-20 RX ADMIN — PANTOPRAZOLE SODIUM 40 MILLIGRAM(S): 20 TABLET, DELAYED RELEASE ORAL at 05:39

## 2018-09-20 RX ADMIN — Medication 25 MILLIGRAM(S): at 10:01

## 2018-09-20 RX ADMIN — Medication 324 MILLIGRAM(S): at 12:53

## 2018-09-20 NOTE — DISCHARGE NOTE ADULT - PROVIDER TOKENS
FREE:[LAST:[DR Olson],FIRST:[Kelsey],PHONE:[(   )    -],FAX:[(   )    -],ADDRESS:[[facility]TOKEN:'[id number]'

## 2018-09-20 NOTE — PROGRESS NOTE ADULT - ATTENDING COMMENTS
Patient interviewed and examined.  Chart reviewed and note edited where appropriate.  Case discussed with fellow.  Agree w/ Assessment and Plan as outlined.    Rj Miller MD [facility]  Spectra:  [phone]  Office: [phone]

## 2018-09-20 NOTE — DISCHARGE NOTE ADULT - INSTRUCTIONS
low sodium and low fat diet with no concentrated carb Follow up with your primary care physician next week

## 2018-09-20 NOTE — DISCHARGE NOTE ADULT - CARE PLAN
Principal Discharge DX:	CAD (coronary artery disease)  Secondary Diagnosis:	Hypertension  Secondary Diagnosis:	Diabetes Principal Discharge DX:	CAD (coronary artery disease)  Goal:	asymptomatic  Assessment and plan of treatment:	Coronary artery disease is a condition where the arteries the supply the heart muscle get clogges with fatty deposits & puts you at risk for a heart attack  Call your doctor if you have any new pain, pressure, or discomfort in the center of your chest, pain, tingling or discomfort in arms, back, neck, jaw, or stomach, shortness of breath, nausea, vomiting, burping or heartburn, sweating, cold and clammy skin, racing or abnormal heartbeat for more than 10 minutes or if they keep coming & going.  Call 911 and do not tr to get to hospital by care  You can help yourself with lefestyle changes (quitting smoking if you smoke), eat lots of fruits & vegetables & low fat dairy products, not a lot of meat & fatty foods, walk or some form of physical activity most days of the week, lose weight if you are overweight  Take your cardiac medication as prescribed to lower cholesterol, to lower blood pressure, aspirin to prevent blood clots, and diabetes control  Make sure to keep appointments with doctor for cardiac follow up care  Secondary Diagnosis:	Hypertension  Assessment and plan of treatment:	Follow up with your medical doctor to establish long term blood pressure treatment goals.  Secondary Diagnosis:	Diabetes  Assessment and plan of treatment:	HgA1C this admission.  Make sure you get your HgA1c checked every three months.  If you take oral diabetes medications, check your blood glucose two times a day.  If you take insulin, check your blood glucose before meals and at bedtime.  It's important not to skip any meals.  Keep a log of your blood glucose results and always take it with you to your doctor appointments.  Keep a list of your current medications including injectables and over the counter medications and bring this medication list with you to all your doctor appointments.  If you have not seen your opthalmologist this year call for appointment.  Check your feet daily for redness, sores, or openings. Do not self treat. If no improvement in two days call your primary care physician for an appointment.  Low blood sugar (hypoglycemia) is a blood sugar below 70mg/dl. Check your blood sugar if you feel signs/symptoms of hypoglycemia. If your blood sugar is below 70 take 15 grams of carbohydrates (ex 4 oz of apple juice, 3-4 glucosr tablets, or 4-6 oz of regular soda) wait 15 minutes and repeat blood sugar to make sure it comes up above 70.  If your blood sugar is above 70 and you are due for a meal, have a meal.  If you are not due for a meal have a snack.  This snack helps keeps your blood sugar at a safe range.

## 2018-09-20 NOTE — DISCHARGE NOTE ADULT - HOSPITAL COURSE
72M PMHx of DM (A1c 7.0), HTN, HLD, CAD s/p stent mid-LAD '03 w known critical 100% occluded LAD at site of stent, presenting with brief transient chest discomfort. 72M PMHx of DM (A1c 7.0), HTN, HLD, CAD s/p stent mid-LAD '03 w known critical 100% occluded LAD at site of stent, presenting with brief transient chest discomfort.  Pt seen and evaluated by Cardiology recommended to add Imdur. Pt remained HD stable  and asymptomatic .Discharged home with close follow up with PCP and cardiologist  for OP stress test

## 2018-09-20 NOTE — DISCHARGE NOTE ADULT - MEDICATION SUMMARY - MEDICATIONS TO TAKE
I will START or STAY ON the medications listed below when I get home from the hospital:    aspirin 81 mg oral tablet, chewable  -- 4 tab(s) by mouth once a day  -- Indication: For antiplatlet    tamsulosin 0.4 mg oral capsule  -- 1 cap(s) by mouth once a day (at bedtime)  -- Indication: For bph    isosorbide mononitrate 30 mg oral tablet, extended release  -- 1 tab(s) by mouth once a day (in the morning)   -- Do not drink alcoholic beverages when taking this medication.  It is very important that you take or use this exactly as directed.  Do not skip doses or discontinue unless directed by your doctor.  Swallow whole.  Do not crush.    -- Indication: For angina    metFORMIN 500 mg oral tablet, extended release  -- 1 tab(s) by mouth once a day  -- Indication: For Dm type 2    Crestor 20 mg oral tablet  -- 1 tab(s) by mouth once a day (at bedtime)   -- Do not take dairy products, antacids, or iron preparations within one hour of this medication.  Do not take this drug if you are pregnant.  It is very important that you take or use this exactly as directed.  Do not skip doses or discontinue unless directed by your doctor.    -- Indication: For Hld    fenofibric acid 135 mg oral delayed release capsule  -- 1 cap(s) by mouth once a day  -- Indication: For Hld    methIMAzole 5 mg oral tablet  -- 1 tab(s) by mouth once a day  -- Indication: For antithyroid    Bystolic 2.5 mg oral tablet  -- 1 tab(s) by mouth once a day  -- Indication: For betablockrs    Linzess 72 mcg oral capsule  -- 1 cap(s) by mouth once a day, As Needed  -- Indication: For gi    glucosamine  -- Indication: For supplement    pantoprazole 40 mg oral delayed release tablet  -- 1 tab(s) by mouth once a day (before a meal)  -- Indication: For ppi    Centrum oral tablet  -- 1 tab(s) by mouth once a day  -- Indication: For supplement    Vitamin D3 2000 intl units oral capsule  -- 1 cap(s) by mouth once a day  -- Indication: For supplement

## 2018-09-20 NOTE — PROGRESS NOTE ADULT - SUBJECTIVE AND OBJECTIVE BOX
Patient is a 72y old  Male who presents with a chief complaint of chest pain (19 Sep 2018 21:16)      SUBJECTIVE / OVERNIGHT EVENTS: Overnight pt endorsing left chest soreness, rated 1/10, non radiating, constant chest discomfort. 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 Currently comfortable in bed, denies Headache dizziness, chest pain or shortness of breath. Prefers to be discharged with close follow up with outpatient cardiologist. On telemetry, normal sinus rhythm w 1st degree AV block 50-70, asymptomatic bradycardia to 49 bpm while sleeping.   Review of symptoms negative unless otherwise stated.     MEDICATIONS  (STANDING):  aspirin  chewable 324 milliGRAM(s) Oral daily  atorvastatin 40 milliGRAM(s) Oral at bedtime  dextrose 5%. 1000 milliLiter(s) (50 mL/Hr) IV Continuous <Continuous>  dextrose 50% Injectable 12.5 Gram(s) IV Push once  dextrose 50% Injectable 25 Gram(s) IV Push once  dextrose 50% Injectable 25 Gram(s) IV Push once  fenofibrate Tablet 145 milliGRAM(s) Oral daily  influenza   Vaccine 0.5 milliLiter(s) IntraMuscular once  insulin lispro (HumaLOG) corrective regimen sliding scale   SubCutaneous three times a day before meals  insulin lispro (HumaLOG) corrective regimen sliding scale   SubCutaneous at bedtime  methimazole 5 milliGRAM(s) Oral daily  metoprolol tartrate 25 milliGRAM(s) Oral two times a day  pantoprazole    Tablet 40 milliGRAM(s) Oral before breakfast  tamsulosin 0.4 milliGRAM(s) Oral at bedtime    MEDICATIONS  (PRN):  dextrose 40% Gel 15 Gram(s) Oral once PRN Blood Glucose LESS THAN 70 milliGRAM(s)/deciliter  glucagon  Injectable 1 milliGRAM(s) IntraMuscular once PRN Glucose LESS THAN 70 milligrams/deciliter      T(C): 36.7 (18 @ 04:15), Max: 37 (18 @ 16:21)  HR: 54 (18 @ 04:15) (54 - 58)  BP: 101/52 (18 @ 04:15) (101/52 - 130/68)  RR: 18 (18 @ 04:15) (16 - 18)  SpO2: 97% (18 @ 04:15) (95% - 98%)  CAPILLARY BLOOD GLUCOSE      POCT Blood Glucose.: 134 mg/dL (20 Sep 2018 08:44)    I&O's Summary  19 Sep 2018 07:01  -  20 Sep 2018 07:00  --------------------------------------------------------  IN: 990 mL / OUT: 0 mL / NET: 990 mL        PHYSICAL EXAM:  GENERAL: No acute distress, well-developed elderly male  HEAD:  Atraumatic, Normocephalic  ENT: EOMI, PERRLA, conjunctiva and sclera clear, Neck supple, No JVD, moist mucosa  CHEST/LUNG: Clear to auscultation bilaterally; No wheeze, equal breath sounds bilaterally   BACK: No spinal tenderness  HEART: Regular rate and rhythm; No murmurs, rubs, or gallops. Reproducible left chest discomfort on palpation.   ABDOMEN: Soft, Nontender, Nondistended; Bowel sounds present  EXTREMITIES:  No clubbing, cyanosis, or edema  PSYCH: Nl behavior, nl affect  NEUROLOGY: AAOx3, non-focal, cranial nerves intact  SKIN: Normal color, No rashes or lesions    LABS:  ( @ 06:29)                        12.4  6.8 )-----------( 249                 37.2    Neutrophils = -- (--%)  Lymphocytes = -- (--%)  Eosinophils = -- (--%)  Basophils = -- (--%)  Monocytes = -- (--%)  Bands = --%    WBC Trend: 6.8<--, 6.4<--  Hb Trend: 12.4<--, 12.5<--  Plt Trend: 249<--, 256<--      138  |  103  |  17  ----------------------------<  122<H>  4.1   |  24  |  1.35<H>    Ca    9.6      20 Sep 2018 06:29    TPro  6.6  /  Alb  4.1  /  TBili  0.3  /  DBili  x   /  AST  21  /  ALT  17  /  AlkPhos  35<L>      Creatinine Trend: 1.35<--, 1.43<--  ( 19 Sep 2018 10:02 )   PT: 11.9 sec;   INR: 1.10 ratio;       PTT:27.9 sec  CARDIAC MARKERS ( 19 Sep 2018 22:45 )  Trop x     /  U/L / CKMB x         EKST DEGREE AV BLOCK T-Wave inversion V4-V6    Consultant(s) Notes Reviewed:  Internal medicine

## 2018-09-20 NOTE — DISCHARGE NOTE ADULT - CARE PROVIDERS DIRECT ADDRESSES
,[email or web address]

## 2018-09-20 NOTE — DISCHARGE NOTE ADULT - CARE PROVIDER_API CALL
Kelsey Mcmahon  PCP  Phone: (   )    -  Fax: (   )    -    Rashid Yost), Cardiovascular Disease; Internal Medicine  [address] [place] [address]  Phone: [phone]  Fax: [phone]
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
## 2018-09-20 NOTE — DISCHARGE NOTE ADULT - PLAN OF CARE
asymptomatic Coronary artery disease is a condition where the arteries the supply the heart muscle get clogges with fatty deposits & puts you at risk for a heart attack  Call your doctor if you have any new pain, pressure, or discomfort in the center of your chest, pain, tingling or discomfort in arms, back, neck, jaw, or stomach, shortness of breath, nausea, vomiting, burping or heartburn, sweating, cold and clammy skin, racing or abnormal heartbeat for more than 10 minutes or if they keep coming & going.  Call 911 and do not tr to get to hospital by care  You can help yourself with lefestyle changes (quitting smoking if you smoke), eat lots of fruits & vegetables & low fat dairy products, not a lot of meat & fatty foods, walk or some form of physical activity most days of the week, lose weight if you are overweight  Take your cardiac medication as prescribed to lower cholesterol, to lower blood pressure, aspirin to prevent blood clots, and diabetes control  Make sure to keep appointments with doctor for cardiac follow up care Follow up with your medical doctor to establish long term blood pressure treatment goals. HgA1C this admission.  Make sure you get your HgA1c checked every three months.  If you take oral diabetes medications, check your blood glucose two times a day.  If you take insulin, check your blood glucose before meals and at bedtime.  It's important not to skip any meals.  Keep a log of your blood glucose results and always take it with you to your doctor appointments.  Keep a list of your current medications including injectables and over the counter medications and bring this medication list with you to all your doctor appointments.  If you have not seen your opthalmologist this year call for appointment.  Check your feet daily for redness, sores, or openings. Do not self treat. If no improvement in two days call your primary care physician for an appointment.  Low blood sugar (hypoglycemia) is a blood sugar below 70mg/dl. Check your blood sugar if you feel signs/symptoms of hypoglycemia. If your blood sugar is below 70 take 15 grams of carbohydrates (ex 4 oz of apple juice, 3-4 glucosr tablets, or 4-6 oz of regular soda) wait 15 minutes and repeat blood sugar to make sure it comes up above 70.  If your blood sugar is above 70 and you are due for a meal, have a meal.  If you are not due for a meal have a snack.  This snack helps keeps your blood sugar at a safe range.

## 2018-09-20 NOTE — DISCHARGE NOTE ADULT - PATIENT PORTAL LINK FT
You can access the [facility] Patient Portal, offered by [facility], by registering with the following website: [email or web address]

## 2018-10-17 NOTE — ED PROVIDER NOTE - PMH
CAD (coronary artery disease)    Constipation    Diabetes    Dyslipidemia    Hypertension    Hyperthyroidism
Attending Only

## 2018-11-23 NOTE — ED ADULT NURSE NOTE - CAS TRG GENERAL AIRWAY, MLM
Reason for Call:  Medication or medication refill:    Do you use a [facility]?  Name of the pharmacy and phone number for the current request:  [facility] [place] tel: [phone]    Name of the medication requested: Subx bridge     Other request: pt will run out of med 11/24, and will need a bridge until her next appt 12/5.     Can we leave a detailed message on this number? YES    Phone number patient can be reached at: Home number on file [phone] (home)    Best Time: anytime     Call taken on 11/23/2018 at 10:44 AM by Dany Mckeon       Patent
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
## 2022-06-03 NOTE — ED PROVIDER NOTE - CPE EDP EYES NORM
[FreeTextEntry1] : Mr. ROBERT BARRERA comes in today to discuss prostate biopsy. \par \par From his general urologic history, Mr. BARRERA reports mild stable lower urinary tract symptoms, with nocturia x 0-1. He has good urinary control. \par IPSS: 5/35\par Sono (performed to assess bladder emptying): 295cc PVR; Prostate 61cc\par \par PSAs: 5/5/22--6.72; 3/17/22--8.58; 1/27/22--5.27 \par \par Prostate MRI 6/2/22:\par 2.8 cm PIRADs 5 anterior midline prostate, likely extracapsular extension. \par 2.0 cm PIRADS 3 left PZPL base\par No lymphadenopathy and no pelvic osseous lesions. \par \par Prostate volume 51cc\par PSA 6.72\par PSAD: 0.13 \par 
normal...

## 2022-10-26 NOTE — ED ADULT TRIAGE NOTE - NS ED TRIAGE AVPU SCALE
10/26/2022    From the office of:   Ninoska Borden MD   [facility], [address]  [address]  [place] [address]  Phone: [phone]  Fax: [phone]    In regards to Nanda Lynne, :  1950    Chief Complaint   Patient presents with   • Eye Exam   • Office Visit     RH - Zostavax 16 no Shingrix  moderna COVID 3-10-21, 21, 21,  22 COVID 2020    Technicians note reviewed 10/26/2022, Ninoska Borden MD.        Chief complaint diabetes check     HISTORY OF PRESENT ILLNESS: 72 year old female with a h/o breast cancer, sleep apnea (on CPAP), HTN, AODM with BDR, S/P CE IOL both eyes  , PVD (posterior vitreous detachment) right eye  2005, CR Nevus right eye , Mild HTN Retinopathy and is here for a dilated exam to check for diabetic retinopathy with OCT-MAC.  She has no complaints or concerns today.  She sometimes will use OTC reading glasses depending on what near/small-task she is performing.  She has chronic floaters and they remain unchanged in shape/size both eyes.  The patient denies having any new floaters, loss of side vision or flashes in either eye. She checks her BS 3x/day and states the readings are \"better 120-160\". She is off Victoza and now on Ozempic.  She had a fall 22 (slipped on the floor and landed on her knee) she hurt her leg, did not hit her head. She ended up going to the ED for leg pain and SOB she was dx with Cellulitis. Last A1C was high- meds changed.     Blood Sugar = 129, result from 10/26/2022    Hemoglobin A1C (%)   Date Value   2022 10.3 (H)     Blood Sugar:     Glucose (mg/dL)   Date Value   2022 225 (H)        SH:   Nanda  reports that she quit smoking about 28 years ago. Her smoking use included cigarettes. She has a 6.00 pack-year smoking history. She has never used smokeless tobacco. She reports current alcohol use of about 2.0 standard drinks of alcohol per week. She reports that she  does not use drugs..      ROS, Mood, Affect:  Alert and oriented x 3, happy. See full ROS sheet patient completed and smart chart (I reviewed).  ROS:   General: No fevers, chills, sweats,   Skin: No rash, bruising, bleeding   Head:  no changes in hearing   Eyes: see above   Chest: No cough, No shortness of breath, chest pain, palpations   GI/: No abdominal pain, diarrhea, constipation, dysuria, increased frequency of urination, urgency to urinate,   Muscle/Skeletal: No fatigue   Psych: No anxiety, depression, thoughts of hurting self or others   Neuro: No loss of conscienceness, numbness or tingling in extremities,   Endocrine: Denies weight changes, changes in thirst, chills, hot or cold intolerance    POH:    CE IOL RIGHT EYE, date 11/30/04 SN60WF 20.5 D  CE IOL LEFT EYE, date 11/02/04  SN60WF 19.5 D  IDDM + BDR 11-23-16,  non compliant with appts missing 12-19-11 to 11-23-16.  PVD right eye Jan. 2005  + 0.25 flat choroidal nevi infranasally,   Missed appts 5-30-18 to 4-18-22    PMH/PSH:  breast CA Partial Mastectomy Left Breast With Radiation No Chemo 2011 (No Tamoxifen)  DM - IDDM diagnosed 1994  HTN  Sleep Apnea Uses CPAP Machine    Breast Cancer                                   1/5/2011        Comment: Left; DCIS w/ high grade dysplasia    Diabetes                                        01/01/1992      Comment: insulin started 2009    Hypertension                                                  Hyperlipidemia                                                Restless leg syndrome                                         Disorder of bone and cartilage, unspecified     09/26/2011      Comment: osteopenia treated with Alendronate since 2011    Vitamin D deficiency                                          Insomnia                                                      Anxiety                                                       Morbid obesity (CMS/HCC)                                      Diabetic retinopathy of  both eyes (CMS/HCC)     11/23/2016    Pseudophakia of both eyes                       11/23/2016    PVD (posterior vitreous detachment), both eyes  11/23/2016    Failed moderate sedation during procedure                       Comment: BLOCK PARALYZED DIAPHRAGM     Sleep apnea                                                     Comment: CPAP Machine , INSTRUCTED TO BRING  Past Surgical History:   Procedure Laterality Date   • Breast biopsy  01/26/2011    lymph nodes   • Breast biopsy  01/05/2011    needle   • Cholecystectomy  4/24/13    for stones   • Colonoscopy diagnostic  04/12/2012    Colonoscopy, Dx   • D and c      D&C x3   • Dexa bone density axial skeleton  12/28/2010   • Eye surgery     • Laminectomy,lumbar  1996   • Mastectomy partial      Breast Lumpectomy left  w/ sentinel node bx   • Removal of tonsils,<12 y/o      Tonsillectomy Alone   • Remv 2nd cataract,corn-scler sectn  11/2004    Cataract removal   • Rotator cuff repair  11/2012    right    • Shoulder open rotator cuff repair Right 5/6/2016   • Shoulder surgery  11/2012    RT SHOULDER RC REPAIR   • Total shoulder arthroplasty Right 03/08/2019         PMD:  Duncan Welsh MD  Endocrinologist [facility]ON[facility]    ALLERGIES:   Allergen Reactions   • Chlorhexidine Gluconate RASH and PRURITUS   • Vicodin [Hydrocodone-Acetaminophen] NAUSEA       Pertinent systemic meds: See EPIC for full med list  Prednisone in the past for acute reasons (back-pain, difficulty breathing) not on currently     FH:  I reviewed the technicians history as outlined in EPIC.; there are no additions.  FAMILY OCULAR HX    Glaucoma: no    Retinal detachment: no    Macular degeneration: ? Mother    Amblyopia (lazy eye), strabismus (ET, XT, etc.): no    Eye surgery: Yes Cataract Surgery Mother    Other eye diseases: no    Eye meds: None    EXAM:  Base Eye Exam     Visual Acuity (Snellen - Linear)       Right Left    Dist cc 20/20 -1 20/20    Correction: Glasses          Tonometry  (Applanation, 2:47 PM)       Right Left    Pressure 14 14          Pupils       Light APD    Right 2 Negative    Left 2.5 Negative          Visual Fields (Counting fingers)       Left Right     Full Full          Extraocular Movement       Right Left     Full, Ortho Full, Ortho          Neuro/Psych     Oriented x3: Yes    Mood/Affect: Normal          Dilation     Both eyes: 1.0% [place] @ 2:47 PM            Additional Notes    OCT INTERPRETATION:  MACULA SCAN   CENTRAL MACULAR THICKNESS:    Signal strength  9/10  Right eye:  282 uM central thickness no edema    Signal strength  9/10  Left eye:  271 uM central thickness no edema           Slit Lamp and Fundus Exam     External Exam       Right Left    External Normal Normal, scar left medial brow          Slit Lamp Exam       Right Left    Lids/Lashes Normal Normal    Conjunctiva/Sclera Clear Clear    Cornea Clear Clear    Anterior Chamber Deep and quiet Deep and quiet    Iris Round and regular, No neovascularization Round and regular, No neovascularization    Lens PC IOL well centered stable PC IOL well centered stable          Fundus Exam       Right Left    Vitreous Clear, Posterior vitreous detachment Clear, Posterior vitreous detachment    Disc Flat, pink with a healthy rim Flat, pink with a healthy rim    C/D Ratio 0.2 0.2    Macula + 0.25 flat choroidal nevi infranasally, + rare microaneurysm along the inferior arcade near the optic nerve Flat and dull, No clinicically significant macular edema, , hemorrhage, cotton wool spots or exudates  + few microaneurysms posterior pole- moslty along inferior arcade , No clinicically significant macular edema, microaneurysms, hemorrhage, cotton wool spots or exudates     Vessels Artery-Vein ratio 1-2 , No neovscularization of the disc or elsewhere Artery-Vein ratio 1-2 , No neovscularization of the disc or elsewhere    Periphery Flat, healthy, without tears or detachments Flat, healthy, without tears or detachments             Refraction     Wearing Rx       Sphere Cylinder Axis    Right -0.75 +1.50 011    Left -0.25 +0.50 157    Type: Distance Single vision                Last Dilated  10/26/2022  - Dilates to 3mm right eye, 4mm left eye   Last OCT-MAC 10/26/2022         ASSESSMENT/PLAN:  1. Type 2 diabetes - Insulin dependent , much higher last A1C- meds adjusted- +Mild nonproliferative background diabetic retinopathy of both eyes without significant macular edema She was advised to follow her doctor's recommendations regarding blood pressure, blood sugar, cholesterol, cardiovascular screening, and anticoagulation.  The importance of follow up and the risk of total permantent blindness or even total loss of the eye was reviewed. She missed appts 5-30-18 to 4-18-22.       2. Pseudophakia, OLD VITREOUS DETACHMENTs, bilaterally:  Signs and symptoms of retinal detachment  were reviewed at length. She is to call immediately if they develop.     3. Chorioretinal nevus, RIGHT EYE:  Benign, flat, with size of 0.25 disc diameter located infranasally, .  Counseled:  I had a  detailed discussion with Ms. Lynne on the natural history, concerns, risk of malignant melanoma, and importance of periodic dilated exams. Risk of malignant melanoma and death was reviewed at length.    4 Interested in soft CTL - can see optom for fit     Letter sent to PMD, 10-26-22  RTC 6 mo dil OCT mac sooner prn, or immediately prn as instructed. Give her the number for Dr Gallegos for CTL         Alert-The patient is alert, awake and responds to voice. The patient is oriented to time, place, and person. The triage nurse is able to obtain subjective information.

## 2023-01-17 NOTE — ED ADULT NURSE NOTE - NSSISCREENINGQ5_ED_A_ED
Patient: Anatoliy Castellanos Date: 2023   : 1941    81 year old male      OUTPATIENT WOUND CARE PROGRESS NOTE    Consulting Provider:  Melvina Pierce DPM      SUBJECTIVE:    Chief Complaint   Patient presents with   • Wound      Left leg and toe wound       Wound/Ulcer Present:      Wound Leg Left Anterior;Medial Venous Ulcer (Active)   Date First Assessed/Time First Assessed: 22 1505   Present on Hospital Admission: Yes  Location: Leg  Laterality: Left  Modifier: Anterior;Medial  Level of Skin Injury: Full Thickness  Primary Wound Type: Venous Ulcer  Wound Approximate Age at F...      Assessments 2023  4:18 PM   Dressing Assessment Intact;Drainage present   Dressing Activity Changed   Dressing Changed On   23   Wound Exudate Minimal;Serous;No odor   Cleansing Agent Normal saline   Wound Bed/Tissue Type Pink;Necrotic tissue, slough   Periwound Condition Hyperpigmented   Wound Edge Attached to wound bed   Wound Status Improving   Wound Last Measured 23   Wound Length (cm) 6.2 cm   Wound Width (cm) 1.8 cm   Wound Depth (cm) 0.2 cm   Wound Surface Area (cm^2) 11.16 cm^2   Wound Volume (cm^3) 2.232 cm^3   PhotoTaken? Yes   Wound Volume Change (Initial) -30.77 cm3   Wound Volume % Change (Initial) -93.24 %   Wound Volume Change (30 days) -1.12 cm3   Wound Volume % Change (30 days) -33.37 %       Wound Toe, 3rd Left Diabetic Ulcer (Active)   Date First Assessed: 22   Location: Toe, 3rd  Laterality: Left  Primary Wound Type: Diabetic Ulcer  Wound Approximate Age at First Assessment (Weeks): 2 weeks      Assessments 2023  4:18 PM   Dressing Assessment Intact   Dressing Activity Changed   Dressing Changed On   23   Wound Exudate None   Cleansing Agent Other (comment) (betadine)   Wound Last Measured 23   Wound Length (cm) 1 cm   Wound Width (cm) 1.3 cm   Wound Depth (cm) 0.1 cm   Wound Surface Area (cm^2) 1.3 cm^2   Wound Volume (cm^3) 0.13 cm^3   PhotoTaken? Yes    Wound Volume Change (Initial) 0.08 cm3   Wound Volume % Change (Initial) 170.83 %   Wound Volume Change (30 days) 0.07 cm3   Wound Volume % Change (30 days) 132.14 %              Maximum Baseline Ambulatory Status:  Ambulates unassisted    History of Present Illness:  This is a 81 year old male rtc for follow up of wounds on the left lower extremity. He has been taking the prescribed antibiotic and has been doing well without any symptom. Denies pain on the left lower extremity/f/c/n/v/diarrhea.      Review of Systems:  Pertinent items are noted in HPI (history of present illness).    Past Medical History:   Diagnosis Date   • Anemia    • Atrial fibrillation (CMS/HCC)    • Blind left eye     COMPLETELY BLIND IN LEFT EYE FOLLOWING BLOOD CLOT IN EYE   • Blood clot associated with vein wall inflammation 2019    BLOOD CLOT TO LEFT EYE.    • Congestive cardiac failure (CMS/HCC)    • COPD (chronic obstructive pulmonary disease) (CMS/HCC)    • Coronary artery disease    • Diabetes mellitus (CMS/HCC)    • Essential (primary) hypertension    • Gout    • High cholesterol    • Open wound     PATIENT REPORTED CURRENTLY BEING SEEN BY WOUND CLINIC AT [facility] FOR CHRONIC WOUNDS TO BILATERAL LEGS DUE TO PVD. PER PATIENT, CURRENLTY OPEN WOUND TO LEFT LEG AND DRY SKIN TO RIGHT LEG.    • PVD (peripheral vascular disease) (CMS/HCC)      Past Surgical History:   Procedure Laterality Date   • Cardiac catherization     • Coronary artery bypass graft  2000   • Endarterectomy Bilateral     CAROTID ENDARDERECTOMY RIGHT IN 2006 AND LEFT IN 2018   • Eye surgery Left     CATARACT SURGERY   • Femoral bypass  2017   • Iliac artery stent Bilateral 2005   • Incision and drainage  03/10/2021    I&D GROIN ABSCESS   • Pacemaker  07/27/2020    [employer] PACEMAKER PLACEMENT   • Removal gallbladder       Social History     Socioeconomic History   • Marital status: /Civil Union     Spouse name: Not on file   • Number of children: Not on file   •  Years of education: Not on file   • Highest education level: Not on file   Occupational History   • Not on file   Tobacco Use   • Smoking status: Former     Types: Cigarettes     Quit date: 2000     Years since quittin.0   • Smokeless tobacco: Never   Vaping Use   • Vaping Use: never used   Substance and Sexual Activity   • Alcohol use: Not Currently     Comment: OCCASIONALLY.    • Drug use: Never   • Sexual activity: Not on file   Other Topics Concern   • Not on file   Social History Narrative   • Not on file     Social Determinants of Health     Financial Resource Strain: Not on file   Food Insecurity: Not on file   Transportation Needs: Not on file   Physical Activity: Not on file   Stress: Not on file   Social Connections: Not on file   Intimate Partner Violence: Not At Risk   • Social Determinants: Intimate Partner Violence Past Fear: No   • Social Determinants: Intimate Partner Violence Current Fear: No     Family History   Problem Relation Age of Onset   • Pneumonia Mother    • Stroke Father        Current Outpatient Medications   Medication Sig   • clindamycin (CLEOCIN) 300 MG capsule Take 1 capsule by mouth in the morning and 1 capsule at noon and 1 capsule in the evening. Do all this for 7 days.   • triamcinolone (ARISTOCORT) 0.1 % cream Apply topically daily.   • apixaBAN (ELIQUIS) 2.5 MG Tab Take 1 tablet by mouth every 12 hours.   • insulin glargine (LANTUS) 100 UNIT/ML vial solution Inject 10 Units into the skin nightly.   • losartan (COZAAR) 25 MG tablet Take 1 tablet by mouth daily. Do not start before 2022.   • polyethylene glycol (MiraLax) 17 GM/SCOOP powder Take 17 g by mouth daily as needed (Constipation). Take in 8 oz of water or juice  MiraLAX or milk of magnesia may be taken as per instructions on the bottle as needed for constipation   • atorvastatin (Lipitor) 80 MG tablet Take 1 tablet by mouth at bedtime.   • insulin lispro 100 UNIT/ML injectable solution Inject 2-10 Units  into the skin daily before dinner. Sliding Scale  150-200 = 2 units  201-250 = 4 units  251-300 = 6 units  301-350 = 8 units  351-400 = 10 units   • docusate sodium-sennosides (SENOKOT S) 50-8.6 MG per tablet Take 1 tablet by mouth 2 times daily as needed for Constipation.   • cholecalciferol (VITAMIN D) 25 mcg (1,000 units) tablet Take 1,000 Units by mouth daily.   • carvedilol (COREG) 25 MG tablet Take 25 mg by mouth every 12 hours.   • allopurinol (ZYLOPRIM) 300 MG tablet Take 300 mg by mouth nightly.    • tamsulosin (FLOMAX) 0.4 MG Cap Take 0.4 mg by mouth at bedtime.    • ferrous sulfate 325 (65 FE) MG tablet Take 325 mg by mouth 3 days a week. On Mon, Wed, and Fri   • magnesium oxide (MAG-OX) 400 (240 Mg) MG tablet Take 400 mg by mouth every morning.    • pramipexole (MIRAPEX) 0.25 MG tablet Take 0.25 mg by mouth at bedtime.    • torsemide (DEMADEX) 10 MG tablet Take 20 mg by mouth daily. 40 mg M, W, Th, Sat, Sun.     No current facility-administered medications for this encounter.        ALLERGIES:  Patient has no known allergies.    OBJECTIVE:  Vital Signs:    Visit Vitals  BP (!) 148/73 (BP Location: RUE - Right upper extremity, Patient Position: Sitting)   Pulse 96   Temp 97.1 °F (36.2 °C) (Temporal)   Resp 16   Wt 80.3 kg (177 lb)   BMI 27.72 kg/m²         Physical Exam:    The left 3rd digit presents with dry gangrene to the left 3rd distal digit with exposed bone no erythema or edema, no fluctuance or crepitus.No drainage or malodor. The right ankle presents with a very small area of wound full thickness without any malodor or purulence no erythema or cellulitis no undermining or crepitus. Base is mainly granular. Rest of exam is unchanged.        PROCEDURE:  Cellular and Tissue Based Products:  Grafix Prime     I have informed the patient of the risks and benefits of this procedure and they have had the opportunity to ask questions.  Appropriate consent has been obtained.      Today's procedure is the  application of Grafix Prime, a cryopreserved placental membrane allogeneic skin substitute, to the Lao Grade 2 ulcer located on the {LEFT ankle.      Please refer to RN notes for measurements      I selected Grafix Prime because its amniotic membrane preserves endogenous living cells rich in collagen, growth factors, fibroblasts, stem cells, and epithelial cells which stimulate healing, protect from infection and scar formation.      The ulcer has failed to show evidence of healing by advancement of the epithelial margin after 30 days of comprehensive, conservative care, so placement of a cellular and/or tissue based therapy is reasonable and necessary.      Adequate treatment of underlying conditions that contributed to the ulcer has been provided and the patient has been compliant with recommendations.      Appropriate off-loading has been provided and the patient has been compliant with this.      Patient's HbA1C does not exceed 12%.      There is no evidence of underlying osteomyelitis or cellulitis and any potential nidus of infection or bacterial colonization has been controlled.     Prior treatments that have failed included moist wound care, debridement of necrotic material.      The patient is a non-smoker or has refrained from systemic tobacco intake for at least 4 weeks during conservative wound care and prior to this procedure.      Appropriate compression bandaging has been provided with diligent use of multilayer bandages, stockings or Velcro devices.      The expiration date of the package(s) is as follows: [id number]      The lot number(s) of the package is as follows:[id number]      The patient refused site marking.      The Wound Care Team called a \"Time Out\" to verify correct patient, procedure and site.      The area was prepped and draped in the usual manner.      The procedure was performed in a clean field.      After checking the patient's allergies, anesthetic was administered with 2%  lidocaine gel.      The wound bed was prepared for grafting with sharp dissection using a 5 mm curette.      I debrided the wound sharply with a curette and necrotic skin and subcutaneous tissue was excised.      The skin substitute 16 mm size was prepared using the 's specific instructions and was placed using sterile techniques.      The skin substitute was secured to the wound bed using steri-strips.        A moist dressing was applied. Compression bandaging was used to manage edema and assist with immobilization.       Procedure was Performed by:  Melvina Pierce DPM    Laboratory assessments reviewed:  No results found for: PAB   Albumin (g/dL)   Date Value   01/06/2023 3.2 (L)   10/09/2022 2.5 (L)   10/07/2022 2.6 (L)      No results available in last 24 hours    Lab Results   Component Value Date    WBC 5.6 01/06/2023    GLUCOSE 214 (H) 01/06/2023    HGBA1C 6.5 (H) 10/07/2022    CRP 2.7 (H) 03/29/2018    RESR 68 (H) 03/29/2018    CREATININE 2.06 (H) 01/06/2023    GFRA 48 07/30/2020    GFRNA 41 07/30/2020        Culture results:  No results found for: SDES No results found for: CULT     Diagnostic Assessments Reviewed:  No new update  XR FOOT 3 OR MORE VIEWS LEFT    Result Date: 1/13/2023  Narrative EXAM: XR FOOT 3 OR MORE VIEWS LEFT CLINICAL INDICATION: Pain of the left 3rd digit.  Soft tissue ulceration. COMPARISON: None. FINDINGS: Three views are presented.  Generalized decreased bone mineral density.  There is poorly defined cortical margins distal phalanx of the left 3rd toe with overlying soft tissue swelling.  The joint spaces are maintained.  No fracture is identified.  Vascular calcifications are present.     Impression 1.  Findings suspicious for osteomyelitis involving the distal phalanx of the left 3rd toe.  Further evaluation with triple phase bone scan or MRI recommended. Electronically Signed by: RANDY LEONE M.D. Signed on: 1/13/2023 2:43 PM    XR FOOT 3 OR MORE VIEWS  LEFT    Result Date: 1/13/2023  Narrative EXAM: XR FOOT 3 OR MORE VIEWS LEFT CLINICAL INDICATION: Pain of the left 3rd digit.  Soft tissue ulceration. COMPARISON: None. FINDINGS: Three views are presented.  Generalized decreased bone mineral density.  There is poorly defined cortical margins distal phalanx of the left 3rd toe with overlying soft tissue swelling.  The joint spaces are maintained.  No fracture is identified.  Vascular calcifications are present.     Impression 1.  Findings suspicious for osteomyelitis involving the distal phalanx of the left 3rd toe.  Further evaluation with triple phase bone scan or MRI recommended. Electronically Signed by: RANDY LEONE M.D. Signed on: 1/13/2023 2:43 PM      Nutritional Assessment:  none at this time    Wound treatment goals are palliative:  No    DIAGNOSES:    1. Diabetic ulcer of toe of left foot associated with type 2 diabetes mellitus, with other ulcer severity (CMS/[facility])    2. Lower extremity edema    3. Chronic venous hypertension (idiopathic) with ulcer of left lower extremity (CODE) (CMS/[facility])    4. Osteomyelitis of third toe of left foot (CMS/[facility])        Medical Decision Making:   After evaluation of patient today, there are many factors inhibiting patient's ability to heal. These wounds will be challenging to heal but we have discussed many recommendation including the following:      Local Wound Care  Complete Wound Care  Every visit  Diagnosis: Other (specify)  Other (specify): venous ulcers  Dressing change(s) to be done by: Wound Care Team  Dressing change(s) to be done by: Other (specify)  Other (specify): patient  Dressing frequency: Weekly  Wound location: left leg wound  Dressing change(s) to be done using: Clean Technique  Clean wound with: Normal saline  Clean wound with: Vashe  Topical agents: Grafix  Dressing type: Foam silicone dressing without borders  Dressing type: Gauze roll-plain  Dressing type: Other (specify)  Cover dressing: Other  (specify)  Other (specify): surgilast  Wound compression: Other (specify)  Other (specify): Tubigrip F or patients own compression stockings  Compression for extremity: Bilateral lower legs  Other (specify): siliconr contact to most distal wound in cluster, non bordered foam to entire cluster area, kerlix, paper tape  Complete Wound Care  Every visit  Diagnosis: Diabetic ulcer  Dressing change(s) to be done by: Wound Care Team  Dressing change(s) to be done by: Other (specify)  Other (specify): Patient  Dressing frequency: Daily  Wound location: left 3rd toe  Dressing change(s) to be done using: Clean Technique  Clean wound with: Other (specify)  Other (specify): Betadine  Topical agents: Betadine solution  Dressing type: Gauze  Dressing type: Other (specify)  Other (specify): kerlix, paper tape  Cover dressing: Other (specify)  Other (specify): surgilast    Order Comments:  None     Risk with Open Wound     As discussed, with open skin there is always a risk of infection.  Infection may lead to need of further surgical debridement or possible amputation.  Infection can possibly lead sepsis and subsequently death or permanent disability.         Offloading    Offloading was reviewed and discussed with patient today.  Fortunately wound location is not in high risk anatomic location for pressure complication, but patient told to be mindful and avoid localized pressure      Patient needs to offload wound area as much as possible in order to allow the healing process to occur more efficiently.      Continue post op shoe    Diabetic Management  Follow up with PCP for DM management and chronic disease management.  Goal for glucose is less than 150 at all times and a HgbA1c of less than 7.4.    Nutrition    Consume protein daily in your diet.  Also try to drink a protein shake daily and take a multivitamin.  You must consume nutrition regularly three times a day to aid in wound healing.    Infectious Disease    F/u with   [facility], continue clindamycin        Consults    Please follow up with Vascular Surgery and infectious disease specialists    Pain  Currently reports no pain      Follow Up  No follow-ups on file.   Patient to follow up as scheduled for continued management and recommendations.    All questions solicited and answered.       Discussed with Dr Luna who recommended vascular sconsultation. Order placed today as well as infectious disease consult with Dr Davidson.   No

## 2023-04-27 ENCOUNTER — APPOINTMENT (OUTPATIENT)
Dept: ORTHOPEDIC SURGERY | Facility: CLINIC | Age: 78
End: 2023-04-27
Payer: MEDICARE

## 2023-04-27 VITALS — WEIGHT: 167 LBS | BODY MASS INDEX: 24.73 KG/M2 | HEIGHT: 69 IN

## 2023-04-27 DIAGNOSIS — Z78.9 OTHER SPECIFIED HEALTH STATUS: ICD-10-CM

## 2023-04-27 DIAGNOSIS — E11.9 TYPE 2 DIABETES MELLITUS W/OUT COMPLICATIONS: ICD-10-CM

## 2023-04-27 DIAGNOSIS — M17.12 UNILATERAL PRIMARY OSTEOARTHRITIS, LEFT KNEE: ICD-10-CM

## 2023-04-27 PROCEDURE — 73564 X-RAY EXAM KNEE 4 OR MORE: CPT | Mod: LT

## 2023-04-27 PROCEDURE — 20611 DRAIN/INJ JOINT/BURSA W/US: CPT | Mod: LT

## 2023-04-27 PROCEDURE — 99203 OFFICE O/P NEW LOW 30 MIN: CPT | Mod: 25

## 2023-04-27 NOTE — HISTORY OF PRESENT ILLNESS
[6] : 6 [Dull/Aching] : dull/aching [Sharp] : sharp [Leisure] : leisure [Sleep] : sleep [Heat] : heat [Standing] : standing [Walking] : walking [Bending forward] : bending forward [de-identified] : 04/27/2023 Mr. KATIE SALEH, a 77 year old male, presents today for left knee pain x3 months. Reports left knee pain and stiffness, denies specific injury. Reports left knee pain stiffness and difficulty with stairs. Denies specific injury or trauma. \par Retired [] : no [FreeTextEntry1] : L Knee [FreeTextEntry5] : Pt complaining of pain in left knee ongoing for 3 months. Swelling in the left knee. Very uncomfortable when walking. ROM limited.   [FreeTextEntry7] : up to the back  [FreeTextEntry9] : Voltaren  [de-identified] : kneeling  [de-identified] : X-rays

## 2023-04-27 NOTE — PHYSICAL EXAM
[Left] : left knee [NL (0)] : extension 0 degrees [Negative] : negative Becky's [] : no extensor lag [TWNoteComboBox7] : flexion 135 degrees

## 2023-04-27 NOTE — PROCEDURE
[Other: ____] : [unfilled] [Left] : of the left [Knee] : knee [Effusion] : effusion [de-identified] : 15cc [de-identified] : clear / straw [FreeTextEntry3] : Large joint injection was performed of the left knee. An injection of Lidocaine 3cc of 1% , Ropivacaine 4cc of 0.5% , Triamcinolone (Kenalog) 2cc of 40 mg  was used. \par Patient was advised to call if redness, pain or fever occur and apply ice for 15 minutes out of every hour for the next 12-24 hours as tolerated. \par \par Patient has tried OTC's including aspirin, Ibuprofen, Aleve, etc or prescription NSAIDS, and/or exercises at home and/or physical therapy without satisfactory response, patient had decreased mobility in the joint and the risks benefits, and alternatives have been discussed, and verbal consent was obtained. \par The site was prepped with alcohol, betadine and ethyl chloride sprayed topically\par \par The risks, benefits and contents of the injection have been discussed.  Risks include but are not limited to allergic reaction, flare reaction, permanent white skin discoloration at the injection site and infection.  The patient understands the risks and agrees to having the injection.  All questions have been answered.\par \par Ultrasound guidance was indicated for this patient due to prior failure or difficult injection. All ultrasound images have been permanently captured and stored accordingly in our picture archiving and communication system.

## 2023-04-27 NOTE — DISCUSSION/SUMMARY
[de-identified] : 77m with left knee djd. \par 1) csi / aspiration left knee today - tolerated well - 15cc\par 2) cryotherapy, rest and activity modification\par 3) hep \par 4) rtc 6 weeks\par \par Entered by Elsy Mccord acting as scribe.\par Dr. Tapia-\par The documentation recorded by the scribe accurately reflects the service I personally performed and the decisions made by me.

## 2023-06-08 ENCOUNTER — APPOINTMENT (OUTPATIENT)
Dept: ORTHOPEDIC SURGERY | Facility: CLINIC | Age: 78
End: 2023-06-08

## 2023-08-10 ENCOUNTER — APPOINTMENT (OUTPATIENT)
Dept: ORTHOPEDIC SURGERY | Facility: CLINIC | Age: 78
End: 2023-08-10
Payer: MEDICARE

## 2023-08-10 VITALS — WEIGHT: 167 LBS | BODY MASS INDEX: 24.73 KG/M2 | HEIGHT: 69 IN

## 2023-08-10 DIAGNOSIS — M54.50 LOW BACK PAIN, UNSPECIFIED: ICD-10-CM

## 2023-08-10 PROCEDURE — 73564 X-RAY EXAM KNEE 4 OR MORE: CPT | Mod: LT

## 2023-08-10 PROCEDURE — 72100 X-RAY EXAM L-S SPINE 2/3 VWS: CPT

## 2023-08-10 PROCEDURE — 99214 OFFICE O/P EST MOD 30 MIN: CPT | Mod: 25

## 2023-08-10 PROCEDURE — 20610 DRAIN/INJ JOINT/BURSA W/O US: CPT | Mod: LT

## 2023-08-10 PROCEDURE — 72170 X-RAY EXAM OF PELVIS: CPT

## 2023-08-10 NOTE — IMAGING
[de-identified] : Constitutional: well developed and well nourished, able to communicate Cardiovascular: Peripheral vascular exam is grossly normal Neurologic: Alert and oriented, no acute distress. Skin: normal skin with no ulcers, rashes, or lesions Pulmonary: No respiratory distress, breathing comfortably on room air Lymphatics: No obvious lymphadenopathy or lymphedema in areas examined  LUMBAR EXAM Alignment: normal Scoliosis: none Spinous process: no tenderness Thoracic paraspinal tenderness: no tenderness Lumbar Paraspinal tenderness: No tenderness  RANGE OF MOTION full and pain free MOTOR EXAM Hip Flexion: 5 /5 Knee Extension: 5 /5 Knee Flexion: 5 /5 Ankle Dorsiflexion: 5 /5 Ankle plantar flexion: 5 /5 Toe Extension: 5 /5  Straight leg sign: negative Sensation intact L2-S1 nerve root distribution Toes warm and well perfused  LEFT KNEE EXAM Alignment: Neutral  Effusion: None Atrophy: None                                                  Stable to Varus/valgus stress Posterior Drawer Test: negative Anterior Drawer Test: Negative Knee Extension/Flexion: 0 / 120  Medial/lateral compartments Medial joint line: No Tenderness Lateral joint line: No Tenderness Becky test: negative  Patellofemoral joint Medial patellar facet: no tenderness Patellar grind: Negative  Tendons: Pes Anserine: No tenderness Gerdys Tubercle/ IT Band: No tenderness Quadriceps Tendon: No Tenderness patellar tendon: no Tenderness Tibial tubercle: not tenderness Calf: no Tenderness  Neurovascular exam Muscle strength: 5/5 Sensation to light touch: intact Distal pulses: 2+  IMAGIN/10/2023 Xrays of the lumbar spine and pelvis were taken demonstrating hips no signs of fractures, dislocations,or significant arthritis. Left knee tricomparmental OA lumbar with mild DDD

## 2023-08-10 NOTE — ASSESSMENT
[FreeTextEntry1] : 77 year M with lumbago and left knee OA - physical therapy and CSI of the left knee

## 2023-08-10 NOTE — HISTORY OF PRESENT ILLNESS
[Gradual] : gradual [8] : 8 [de-identified] : 08/10/2023 Mr. KATIE SALEH is a 77 year male that comes in today with a chief complaint of left knee Cm hip  [] : no

## 2023-11-28 ENCOUNTER — APPOINTMENT (OUTPATIENT)
Dept: CT IMAGING | Facility: IMAGING CENTER | Age: 78
End: 2023-11-28

## 2023-11-28 ENCOUNTER — OUTPATIENT (OUTPATIENT)
Dept: OUTPATIENT SERVICES | Facility: HOSPITAL | Age: 78
LOS: 1 days | End: 2023-11-28
Payer: COMMERCIAL

## 2023-11-28 DIAGNOSIS — Z98.890 OTHER SPECIFIED POSTPROCEDURAL STATES: Chronic | ICD-10-CM

## 2023-11-28 DIAGNOSIS — R63.4 ABNORMAL WEIGHT LOSS: ICD-10-CM

## 2023-11-28 PROCEDURE — 74176 CT ABD & PELVIS W/O CONTRAST: CPT

## 2023-11-28 PROCEDURE — 74176 CT ABD & PELVIS W/O CONTRAST: CPT | Mod: 26

## 2024-01-24 ENCOUNTER — OUTPATIENT (OUTPATIENT)
Dept: OUTPATIENT SERVICES | Facility: HOSPITAL | Age: 79
LOS: 1 days | Discharge: HOME | End: 2024-01-24
Payer: MEDICARE

## 2024-01-24 VITALS
DIASTOLIC BLOOD PRESSURE: 62 MMHG | HEIGHT: 69 IN | RESPIRATION RATE: 15 BRPM | TEMPERATURE: 97 F | OXYGEN SATURATION: 99 % | WEIGHT: 166.01 LBS | SYSTOLIC BLOOD PRESSURE: 143 MMHG | HEART RATE: 60 BPM

## 2024-01-24 VITALS
DIASTOLIC BLOOD PRESSURE: 58 MMHG | HEART RATE: 61 BPM | SYSTOLIC BLOOD PRESSURE: 123 MMHG | OXYGEN SATURATION: 96 % | RESPIRATION RATE: 20 BRPM

## 2024-01-24 DIAGNOSIS — R10.817 GENERALIZED ABDOMINAL TENDERNESS: ICD-10-CM

## 2024-01-24 DIAGNOSIS — Z98.890 OTHER SPECIFIED POSTPROCEDURAL STATES: Chronic | ICD-10-CM

## 2024-01-24 LAB — GLUCOSE BLDC GLUCOMTR-MCNC: 107 MG/DL — HIGH (ref 70–99)

## 2024-01-24 PROCEDURE — 88305 TISSUE EXAM BY PATHOLOGIST: CPT | Mod: 26

## 2024-01-24 RX ORDER — SODIUM CHLORIDE 9 MG/ML
500 INJECTION, SOLUTION INTRAVENOUS
Refills: 0 | Status: COMPLETED | OUTPATIENT
Start: 2024-01-24 | End: 2024-01-24

## 2024-01-24 RX ADMIN — SODIUM CHLORIDE 30 MILLILITER(S): 9 INJECTION, SOLUTION INTRAVENOUS at 08:57

## 2024-01-24 NOTE — ASU PATIENT PROFILE, ADULT - NSICDXPASTMEDICALHX_GEN_ALL_CORE_FT
PAST MEDICAL HISTORY:  CAD (coronary artery disease)     Constipation     Diabetes     Dyslipidemia     Hypertension     Hyperthyroidism

## 2024-01-30 LAB — SURGICAL PATHOLOGY STUDY: SIGNIFICANT CHANGE UP

## 2024-06-27 ENCOUNTER — OUTPATIENT (OUTPATIENT)
Dept: OUTPATIENT SERVICES | Facility: HOSPITAL | Age: 79
LOS: 1 days | Discharge: HOME | End: 2024-06-27
Payer: MEDICARE

## 2024-06-27 VITALS
RESPIRATION RATE: 20 BRPM | HEIGHT: 69 IN | TEMPERATURE: 97 F | HEART RATE: 56 BPM | DIASTOLIC BLOOD PRESSURE: 68 MMHG | OXYGEN SATURATION: 99 % | SYSTOLIC BLOOD PRESSURE: 125 MMHG | WEIGHT: 164.91 LBS

## 2024-06-27 VITALS
OXYGEN SATURATION: 98 % | RESPIRATION RATE: 16 BRPM | SYSTOLIC BLOOD PRESSURE: 111 MMHG | DIASTOLIC BLOOD PRESSURE: 78 MMHG | HEART RATE: 55 BPM

## 2024-06-27 DIAGNOSIS — R10.30 LOWER ABDOMINAL PAIN, UNSPECIFIED: ICD-10-CM

## 2024-06-27 DIAGNOSIS — Z98.890 OTHER SPECIFIED POSTPROCEDURAL STATES: Chronic | ICD-10-CM

## 2024-06-27 LAB
GLUCOSE BLDC GLUCOMTR-MCNC: 103 MG/DL — HIGH (ref 70–99)
GLUCOSE BLDC GLUCOMTR-MCNC: 112 MG/DL — HIGH (ref 70–99)

## 2024-06-27 PROCEDURE — 88305 TISSUE EXAM BY PATHOLOGIST: CPT | Mod: 26

## 2024-06-27 RX ORDER — NEBIVOLOL HYDROCHLORIDE 5 MG/1
1 TABLET ORAL
Qty: 0 | Refills: 0 | DISCHARGE

## 2024-06-27 RX ORDER — CHOLECALCIFEROL (VITAMIN D3) 125 MCG
1 CAPSULE ORAL
Qty: 0 | Refills: 0 | DISCHARGE

## 2024-06-27 RX ORDER — LINACLOTIDE 145 UG/1
1 CAPSULE, GELATIN COATED ORAL
Qty: 0 | Refills: 0 | DISCHARGE

## 2024-06-27 RX ORDER — FENOFIBRIC ACID 105 MG/1
1 TABLET ORAL
Qty: 0 | Refills: 0 | DISCHARGE

## 2024-06-27 RX ORDER — METFORMIN HYDROCHLORIDE 850 MG/1
1 TABLET ORAL
Qty: 0 | Refills: 0 | DISCHARGE

## 2024-06-27 RX ORDER — MULTIVIT-MIN/FERROUS GLUCONATE 9 MG/15 ML
1 LIQUID (ML) ORAL
Qty: 0 | Refills: 0 | DISCHARGE

## 2024-07-01 LAB — SURGICAL PATHOLOGY STUDY: SIGNIFICANT CHANGE UP

## 2025-05-27 ENCOUNTER — OUTPATIENT (OUTPATIENT)
Dept: OUTPATIENT SERVICES | Facility: HOSPITAL | Age: 80
LOS: 1 days | End: 2025-05-27
Payer: COMMERCIAL

## 2025-05-27 ENCOUNTER — TRANSCRIPTION ENCOUNTER (OUTPATIENT)
Age: 80
End: 2025-05-27

## 2025-05-27 VITALS
SYSTOLIC BLOOD PRESSURE: 150 MMHG | TEMPERATURE: 98 F | HEART RATE: 61 BPM | WEIGHT: 160.06 LBS | RESPIRATION RATE: 16 BRPM | DIASTOLIC BLOOD PRESSURE: 67 MMHG | HEIGHT: 69 IN | OXYGEN SATURATION: 100 %

## 2025-05-27 VITALS
RESPIRATION RATE: 17 BRPM | OXYGEN SATURATION: 98 % | DIASTOLIC BLOOD PRESSURE: 72 MMHG | HEART RATE: 66 BPM | SYSTOLIC BLOOD PRESSURE: 136 MMHG

## 2025-05-27 DIAGNOSIS — I25.10 ATHEROSCLEROTIC HEART DISEASE OF NATIVE CORONARY ARTERY WITHOUT ANGINA PECTORIS: ICD-10-CM

## 2025-05-27 DIAGNOSIS — Z98.890 OTHER SPECIFIED POSTPROCEDURAL STATES: Chronic | ICD-10-CM

## 2025-05-27 LAB
ANION GAP SERPL CALC-SCNC: 13 MMOL/L — SIGNIFICANT CHANGE UP (ref 5–17)
BUN SERPL-MCNC: 18 MG/DL — SIGNIFICANT CHANGE UP (ref 7–23)
CALCIUM SERPL-MCNC: 10.8 MG/DL — HIGH (ref 8.4–10.5)
CHLORIDE SERPL-SCNC: 102 MMOL/L — SIGNIFICANT CHANGE UP (ref 96–108)
CO2 SERPL-SCNC: 23 MMOL/L — SIGNIFICANT CHANGE UP (ref 22–31)
CREAT SERPL-MCNC: 1.3 MG/DL — SIGNIFICANT CHANGE UP (ref 0.5–1.3)
EGFR: 56 ML/MIN/1.73M2 — LOW
EGFR: 56 ML/MIN/1.73M2 — LOW
GLUCOSE BLDC GLUCOMTR-MCNC: 99 MG/DL — SIGNIFICANT CHANGE UP (ref 70–99)
GLUCOSE SERPL-MCNC: 119 MG/DL — HIGH (ref 70–99)
HCT VFR BLD CALC: 39.7 % — SIGNIFICANT CHANGE UP (ref 39–50)
HGB BLD-MCNC: 12.4 G/DL — LOW (ref 13–17)
MCHC RBC-ENTMCNC: 26.3 PG — LOW (ref 27–34)
MCHC RBC-ENTMCNC: 31.2 G/DL — LOW (ref 32–36)
MCV RBC AUTO: 84.1 FL — SIGNIFICANT CHANGE UP (ref 80–100)
NRBC BLD AUTO-RTO: 0 /100 WBCS — SIGNIFICANT CHANGE UP (ref 0–0)
PLATELET # BLD AUTO: 253 K/UL — SIGNIFICANT CHANGE UP (ref 150–400)
POTASSIUM SERPL-MCNC: 4.1 MMOL/L — SIGNIFICANT CHANGE UP (ref 3.5–5.3)
POTASSIUM SERPL-SCNC: 4.1 MMOL/L — SIGNIFICANT CHANGE UP (ref 3.5–5.3)
RBC # BLD: 4.72 M/UL — SIGNIFICANT CHANGE UP (ref 4.2–5.8)
RBC # FLD: 12.9 % — SIGNIFICANT CHANGE UP (ref 10.3–14.5)
SODIUM SERPL-SCNC: 138 MMOL/L — SIGNIFICANT CHANGE UP (ref 135–145)
WBC # BLD: 6.83 K/UL — SIGNIFICANT CHANGE UP (ref 3.8–10.5)
WBC # FLD AUTO: 6.83 K/UL — SIGNIFICANT CHANGE UP (ref 3.8–10.5)

## 2025-05-27 PROCEDURE — 85027 COMPLETE CBC AUTOMATED: CPT

## 2025-05-27 PROCEDURE — 92928 PRQ TCAT PLMT NTRAC ST 1 LES: CPT | Mod: RC

## 2025-05-27 PROCEDURE — C1894: CPT

## 2025-05-27 PROCEDURE — 93010 ELECTROCARDIOGRAM REPORT: CPT | Mod: 77

## 2025-05-27 PROCEDURE — 93010 ELECTROCARDIOGRAM REPORT: CPT

## 2025-05-27 PROCEDURE — 93454 CORONARY ARTERY ANGIO S&I: CPT | Mod: 26,59

## 2025-05-27 PROCEDURE — C1769: CPT

## 2025-05-27 PROCEDURE — C1887: CPT

## 2025-05-27 PROCEDURE — 80048 BASIC METABOLIC PNL TOTAL CA: CPT

## 2025-05-27 PROCEDURE — 99152 MOD SED SAME PHYS/QHP 5/>YRS: CPT

## 2025-05-27 PROCEDURE — 93005 ELECTROCARDIOGRAM TRACING: CPT

## 2025-05-27 PROCEDURE — C9600: CPT | Mod: RC

## 2025-05-27 PROCEDURE — 93454 CORONARY ARTERY ANGIO S&I: CPT | Mod: 59

## 2025-05-27 PROCEDURE — C1874: CPT

## 2025-05-27 PROCEDURE — 82962 GLUCOSE BLOOD TEST: CPT

## 2025-05-27 RX ORDER — ASPIRIN 325 MG
1 TABLET ORAL
Qty: 90 | Refills: 3
Start: 2025-05-27 | End: 2026-05-21

## 2025-05-27 RX ORDER — CLOPIDOGREL BISULFATE 75 MG/1
1 TABLET, FILM COATED ORAL
Qty: 90 | Refills: 3
Start: 2025-05-27 | End: 2026-05-21

## 2025-05-27 RX ORDER — ACETAMINOPHEN 500 MG/5ML
650 LIQUID (ML) ORAL ONCE
Refills: 0 | Status: COMPLETED | OUTPATIENT
Start: 2025-05-27 | End: 2025-05-27

## 2025-05-27 RX ADMIN — Medication 650 MILLIGRAM(S): at 17:11

## 2025-05-27 NOTE — ASU PATIENT PROFILE, ADULT - PRESSURE ULCER(S)
MONOCLONAL ANTIBODY Treatment  Phone Encounter / Casirivimab (bvw-l-QWC-i-mab)  and Imdevimab (im-DEV-i-mab) Infusion Novel Therapy Emergency Use Authorization:    Reviewed with patient:  • This treatment has unknown efficacy, meaning we have no way of knowing if this will truly help you. However, we believe it might. It is designed to block viral attachment and entry into human cells. It is investigational.  • Patient met inclusion criteria as reviewed by a triage team of clinical providers.    Patient EDUCATION MATERIALS were provided via email (and will be provided to the patient on arrival to their infusion appointment).  Sent to patient:  o FACT SHEET for Patient, Parent and Caregivers, Emergency Use Authorization (EUA) REGEN-COV (casirivimab/imdevimab) for COVID-19 ([employer] Patient Education Materials) sent link for patient to access and review. As previously mentioned, patient is aware this is a new drug / investigational and approved for emergency use.   - Primary Provider has reviewed positive COVID-19 (disease related information) with the patient; patient is aware that they are positive for the disease.  - Reviewed with patient infusion procedure:   o Patient assessed on arrival to ensure they are stable to receive the infusion (per the EUA /  no exclusion criteria)  o Treatment is given by 4 subcutaneous injections delivered in 4 different sites.  o Reviewed side effects (allergy to drug, dizziness, etc.) and provided document to review other possible outcomes; reviewed management of side effects.    Patient verbalized an understanding of the education provided. Provided consent to schedule and receive the monoclonal antibody therapy.    Reviewed infusion location, address and number to call on arrival. Instructed patient on arrival to the infusion center:  • Wear a mask  • No visitors  • Call the number provided when you are ready    Reviewed and offered an opportunity for  patient and caregiver questions.     *CDC recommends waiting 90 days after receiving monoclonal antibody therapy to be vaccinated.   no

## 2025-05-27 NOTE — ASU DISCHARGE PLAN (ADULT/PEDIATRIC) - FINANCIAL ASSISTANCE
[facility] provides services at a reduced cost to those who are determined to be eligible through [facility]’s financial assistance program. Information regarding [facility]’s financial assistance program can be found by going to [email or web address] or by calling [phone].

## 2025-05-27 NOTE — H&P CARDIOLOGY - HISTORY OF PRESENT ILLNESS
HPI: 79M PMHx of DMT2, HTN, HLD, CAD s/p stent mid-LAD '03 w known critical 100% occluded LAD at site of stent, visualized on 11/17 Cath, recommended CABG at the time however family refusing intervention. Patient presents for [facility] with possible intervention with dr Holm referred by dr Tobias for symptoms of intermittent CP.  Denies dizziness, diaphoresis, palpitations, nausea, vomiting, peripheral edema, recent weight gain, or syncope.   Card dr Tobias    < from: Cardiac Cath Lab - Adult (11.13.17 @ 14:11) >  VENTRICLES: EF estimated was 60 %.  CORONARY VESSELS: The coronary circulation is right dominant.  LM:   --  LM: Normal.  LAD:   --  Mid LAD: There was a tubular 100 % stenosis at the site of a  prior angioplasty with stent placement. There was ROSEANNA grade 0 flow  through the vessel (no flow). This is a likely culprit for the patient's  clinical presentation. It appears amenable to percutaneous intervention.  CX:   -- Circumflex: Angiography showed minor luminal irregularities with  no flow limiting lesions.  RCA:   --  Mid RCA: There was a 40 % stenosis.  --  Distal RCA: There was a 40 % stenosis.  COMPLICATIONS: There were no complications.  DIAGNOSTIC RECOMMENDATIONS: GIven the recurrent angina, the mLAD  should  be revascularized with PCI via an antegrade approach.  Will discuss with family and determine optimal timing given the holidays.  INTERVENTIONAL RECOMMENDATIONS: GIven the recurrent angina, themLAD   should be revascularized with PCI via an antegrade approach.  Will discuss with family and determine optimal timing given the holidays.  Prepared and signed by  Edelmira Holm M.D.  Signed 11/17/2017 14:12:03    < end of copied text >

## 2025-05-27 NOTE — ASU DISCHARGE PLAN (ADULT/PEDIATRIC) - CARE PROVIDER_API CALL
Kendrick Tobias P  Cardiology  [address] [place] [address]  Phone: [phone]  Fax: [phone]  Follow Up Time:

## 2025-05-27 NOTE — CHART NOTE - NSCHARTNOTEFT_GEN_A_CORE
Cardiac Rehab Referral s/p PCI to mid RCA            *Education on benefits of Cardiac Rehab provided to patient: Yes         *Referral and Prescription Given for Cardiac Rehab : Yes         *Pt given list of locations & instructed to contact their insurance company to review list of participating providers: Yes         *Pt instructed to bring Cardiac Rehab prescription with them to Cardiology Follow up appointment for assistance with enrollment: Yes         *Pt discharged with copies detail cardiovascular history, medications, testing/treatments: Yes.

## 2025-05-27 NOTE — ASU DISCHARGE PLAN (ADULT/PEDIATRIC) - ASU DC SPECIAL INSTRUCTIONSFT
Wound Care:   the day AFTER your procedure remove bandage GENTLY, and clean using  mild soap and gentle warm, water stream, pat dry. leave OPEN to air. YOU MAY SHOWER   DO NOT apply lotions, creams, ointments, powder, perfumes to your incision site  DO NOT SOAK your site for 1 week ( no baths, no pools, no tubs, etc...)  Check  your groin and /or wrist daily. A small amount of bruising, and soreness are normal    ACTIVITY: for 24 hours   - DO NOT DRIVE  - DO NOT make any important decisions or sign legal documents   - DO NOT operate heavy Invrepenaries   - you may resume sexual activity in 48 hours, unless otherwise instructed by your cardiologist     If your procedure was done through the WRIST: for the NEXT 3DAYS:  - avoid pushing, pulling, with that affected wrist   - avoid repeated movement of that hand and wrist ( eg: typing, hammering)  - DO NOT LIFT anything more than 5 lbs   MEDICATION:   take your medications as explained ( see discharge paperwork)   If you received a STENT, you will be taking antiplatelet medications to KEEP YOUR STENT OPEN ( eg: Aspirin, Plavix, Brilinta, Effient, etc).  Take as prescribed DO NOT STOP taking them without consulting with your cardiologist first.     Follow heart healthy diet reccomended by your doctor, if you smoke STOP SMOKING ( may call [phone] for center of tobacco control if you need assistance)     CALL your doctor to make appointment in 2 WEEKS     ***CALL YOUR DOCTOR***  if you experience: fever, chills, body aches, or severe pain, swelling, redness, heat or yellow discharge at incision site  If you experience Bleeding or excruciating pain at the procedural site, swelling ( golf ball size) at your procedural site  If you experience CHEST PAIN  If you experience extremity numbness, tingling, temperature change ( of your procedural site)   If you are unable to reach your doctor, you may contact:   -Cardiology Office at [facility] at [phone] or   - [facility] [phone]  - [facility] [phone]

## 2025-05-27 NOTE — ASU PATIENT PROFILE, ADULT - FALL HARM RISK - UNIVERSAL INTERVENTIONS
Bed in lowest position, wheels locked, appropriate side rails in place/Call bell, personal items and telephone in reach/Instruct patient to call for assistance before getting out of bed or chair/Non-slip footwear when patient is out of bed/[place] to call system/Physically safe environment - no spills, clutter or unnecessary equipment/Purposeful Proactive Rounding/Room/bathroom lighting operational, light cord in reach

## 2025-06-16 ENCOUNTER — NON-APPOINTMENT (OUTPATIENT)
Age: 80
End: 2025-06-16

## 2025-06-16 ENCOUNTER — APPOINTMENT (OUTPATIENT)
Dept: CARDIOLOGY | Facility: CLINIC | Age: 80
End: 2025-06-16

## 2025-06-16 VITALS
DIASTOLIC BLOOD PRESSURE: 60 MMHG | HEIGHT: 69 IN | SYSTOLIC BLOOD PRESSURE: 124 MMHG | OXYGEN SATURATION: 99 % | BODY MASS INDEX: 24.73 KG/M2 | HEART RATE: 57 BPM | WEIGHT: 167 LBS

## 2025-06-16 PROCEDURE — G2211 COMPLEX E/M VISIT ADD ON: CPT

## 2025-06-16 PROCEDURE — 99215 OFFICE O/P EST HI 40 MIN: CPT

## 2025-06-16 PROCEDURE — 93000 ELECTROCARDIOGRAM COMPLETE: CPT

## 2025-06-16 RX ORDER — LINACLOTIDE 72 UG/1
CAPSULE, GELATIN COATED ORAL
Refills: 0 | Status: ACTIVE | COMMUNITY

## 2025-06-16 RX ORDER — NEBIVOLOL HYDROCHLORIDE 2.5 MG/1
2.5 TABLET ORAL
Refills: 0 | Status: ACTIVE | COMMUNITY

## 2025-06-16 RX ORDER — MEMANTINE 10 MG/1
10 TABLET ORAL
Qty: 180 | Refills: 0 | Status: ACTIVE | COMMUNITY
Start: 2025-01-23

## 2025-06-16 RX ORDER — ROSUVASTATIN CALCIUM 10 MG/1
10 TABLET, FILM COATED ORAL
Refills: 0 | Status: ACTIVE | COMMUNITY

## 2025-06-16 RX ORDER — METHIMAZOLE 5 MG/1
5 TABLET ORAL
Qty: 90 | Refills: 0 | Status: ACTIVE | COMMUNITY
Start: 2025-05-08

## 2025-06-16 RX ORDER — MELATONIN 3 MG
TABLET ORAL
Refills: 0 | Status: ACTIVE | COMMUNITY

## 2025-06-16 RX ORDER — ASPIRIN 81 MG/1
81 TABLET, DELAYED RELEASE ORAL
Qty: 90 | Refills: 3 | Status: ACTIVE | COMMUNITY

## 2025-06-16 RX ORDER — METFORMIN ER 500 MG 500 MG/1
500 TABLET ORAL
Refills: 0 | Status: ACTIVE | COMMUNITY

## 2025-06-16 RX ORDER — MULTIVIT-MIN/FA/LYCOPEN/LUTEIN .4-300-25
TABLET ORAL DAILY
Refills: 0 | Status: ACTIVE | COMMUNITY

## 2025-06-16 RX ORDER — ISOSORBIDE MONONITRATE 30 MG/1
30 TABLET, EXTENDED RELEASE ORAL
Qty: 90 | Refills: 1 | Status: ACTIVE | COMMUNITY

## 2025-06-16 RX ORDER — TAMSULOSIN HYDROCHLORIDE 0.4 MG/1
0.4 CAPSULE ORAL
Refills: 0 | Status: ACTIVE | COMMUNITY

## 2025-06-16 RX ORDER — CLOPIDOGREL BISULFATE 75 MG/1
75 TABLET, FILM COATED ORAL
Qty: 90 | Refills: 1 | Status: ACTIVE | COMMUNITY
Start: 2025-05-27

## 2025-06-16 RX ORDER — FENOFIBRIC ACID 135 MG/1
135 CAPSULE, DELAYED RELEASE ORAL
Refills: 0 | Status: ACTIVE | COMMUNITY

## (undated) DEVICE — GOWN LG

## (undated) DEVICE — ELCTR GROUNDING PAD ADULT COVIDIEN

## (undated) DEVICE — DRSG 2X2

## (undated) DEVICE — BIOPSY FORCEP COLD DISP

## (undated) DEVICE — TUBING IV SET GRAVITY 3Y 100" MACRO

## (undated) DEVICE — BASIN EMESIS 10IN GRADUATED MAUVE

## (undated) DEVICE — BITE BLOCK ADULT 20 X 27MM (GREEN)

## (undated) DEVICE — TUBING MEDI-VAC W MAXIGRIP CONNECTORS 1/4"X6'

## (undated) DEVICE — DRSG CURITY GAUZE SPONGE 4 X 4" 12-PLY NON-STERILE

## (undated) DEVICE — LINE BREATHE SAMPLNG

## (undated) DEVICE — BIOPSY FORCEP RADIAL JAW 4 STANDARD WITH NEEDLE

## (undated) DEVICE — CATH IV SAFE BC 22G X 1" (BLUE)

## (undated) DEVICE — ELCTR ECG CONDUCTIVE ADHESIVE

## (undated) DEVICE — LUBRICATING JELLY HR ONE SHOT 3G

## (undated) DEVICE — DENTURE CUP PINK

## (undated) DEVICE — DRSG BANDAID 0.75X3"

## (undated) DEVICE — PACK IV START WITH CHG

## (undated) DEVICE — UNDERPAD LINEN SAVER 17 X 24"

## (undated) DEVICE — TUBING SUCTION NONCONDUCTIVE 6MM X 12FT

## (undated) DEVICE — CONTAINER FORMALIN 10% 20ML

## (undated) DEVICE — CONTAINER FORMALIN 80ML YELLOW

## (undated) DEVICE — CLAMP BX HOT RAD JAW 3

## (undated) DEVICE — SALIVA EJECTOR (BLUE)